# Patient Record
Sex: MALE | Race: BLACK OR AFRICAN AMERICAN | Employment: UNEMPLOYED | URBAN - METROPOLITAN AREA
[De-identification: names, ages, dates, MRNs, and addresses within clinical notes are randomized per-mention and may not be internally consistent; named-entity substitution may affect disease eponyms.]

---

## 2021-08-26 ENCOUNTER — APPOINTMENT (OUTPATIENT)
Dept: GENERAL RADIOLOGY | Age: 61
End: 2021-08-26
Attending: EMERGENCY MEDICINE
Payer: MEDICARE

## 2021-08-26 ENCOUNTER — HOSPITAL ENCOUNTER (EMERGENCY)
Age: 61
Discharge: HOME | End: 2021-08-27
Attending: EMERGENCY MEDICINE | Admitting: EMERGENCY MEDICINE
Payer: MEDICARE

## 2021-08-26 ENCOUNTER — HOSPITAL ENCOUNTER (EMERGENCY)
Age: 61
Discharge: LEFT BEFORE DISCHARGE | End: 2021-08-26
Payer: MEDICARE

## 2021-08-26 DIAGNOSIS — R07.9 CHEST PAIN, UNSPECIFIED TYPE: Primary | ICD-10-CM

## 2021-08-26 LAB
ALBUMIN SERPL-MCNC: 3.2 G/DL (ref 3.5–5)
ALBUMIN/GLOB SERPL: 0.7 {RATIO} (ref 1.1–2.2)
ALP SERPL-CCNC: 118 U/L (ref 45–117)
ALT SERPL-CCNC: 28 U/L (ref 12–78)
ANION GAP SERPL CALC-SCNC: 6 MMOL/L (ref 5–15)
AST SERPL W P-5'-P-CCNC: 20 U/L (ref 15–37)
BASOPHILS # BLD: 0 K/UL (ref 0–0.1)
BASOPHILS NFR BLD: 0 % (ref 0–1)
BILIRUB SERPL-MCNC: 0.3 MG/DL (ref 0.2–1)
BUN SERPL-MCNC: 12 MG/DL (ref 6–20)
BUN/CREAT SERPL: 10 (ref 12–20)
CA-I BLD-MCNC: 9.1 MG/DL (ref 8.5–10.1)
CHLORIDE SERPL-SCNC: 109 MMOL/L (ref 97–108)
CO2 SERPL-SCNC: 26 MMOL/L (ref 21–32)
CREAT SERPL-MCNC: 1.24 MG/DL (ref 0.7–1.3)
DIFFERENTIAL METHOD BLD: ABNORMAL
EOSINOPHIL # BLD: 0 K/UL (ref 0–0.4)
EOSINOPHIL NFR BLD: 0 % (ref 0–7)
ERYTHROCYTE [DISTWIDTH] IN BLOOD BY AUTOMATED COUNT: 14.1 % (ref 11.5–14.5)
GLOBULIN SER CALC-MCNC: 4.7 G/DL (ref 2–4)
GLUCOSE SERPL-MCNC: 121 MG/DL (ref 65–100)
HCT VFR BLD AUTO: 36.5 % (ref 36.6–50.3)
HGB BLD-MCNC: 12.2 G/DL (ref 12.1–17)
IMM GRANULOCYTES # BLD AUTO: 0 K/UL (ref 0–0.04)
IMM GRANULOCYTES NFR BLD AUTO: 0 % (ref 0–0.5)
LIPASE SERPL-CCNC: 69 U/L (ref 73–393)
LYMPHOCYTES # BLD: 0.7 K/UL (ref 0.8–3.5)
LYMPHOCYTES NFR BLD: 6 % (ref 12–49)
MCH RBC QN AUTO: 29.8 PG (ref 26–34)
MCHC RBC AUTO-ENTMCNC: 33.4 G/DL (ref 30–36.5)
MCV RBC AUTO: 89 FL (ref 80–99)
MONOCYTES # BLD: 0.2 K/UL (ref 0–1)
MONOCYTES NFR BLD: 2 % (ref 5–13)
NEUTS SEG # BLD: 11.4 K/UL (ref 1.8–8)
NEUTS SEG NFR BLD: 92 % (ref 32–75)
NRBC # BLD: 0 K/UL (ref 0–0.01)
NRBC BLD-RTO: 0 PER 100 WBC
PLATELET # BLD AUTO: 369 K/UL (ref 150–400)
PMV BLD AUTO: 9.3 FL (ref 8.9–12.9)
POTASSIUM SERPL-SCNC: 4 MMOL/L (ref 3.5–5.1)
PROT SERPL-MCNC: 7.9 G/DL (ref 6.4–8.2)
RBC # BLD AUTO: 4.1 M/UL (ref 4.1–5.7)
SODIUM SERPL-SCNC: 141 MMOL/L (ref 136–145)
TROPONIN I SERPL-MCNC: <0.05 NG/ML
WBC # BLD AUTO: 12.4 K/UL (ref 4.1–11.1)

## 2021-08-26 PROCEDURE — 36415 COLL VENOUS BLD VENIPUNCTURE: CPT

## 2021-08-26 PROCEDURE — 71045 X-RAY EXAM CHEST 1 VIEW: CPT

## 2021-08-26 PROCEDURE — 93005 ELECTROCARDIOGRAM TRACING: CPT

## 2021-08-26 PROCEDURE — 74011250637 HC RX REV CODE- 250/637: Performed by: EMERGENCY MEDICINE

## 2021-08-26 PROCEDURE — 96375 TX/PRO/DX INJ NEW DRUG ADDON: CPT

## 2021-08-26 PROCEDURE — 75810000275 HC EMERGENCY DEPT VISIT NO LEVEL OF CARE

## 2021-08-26 PROCEDURE — 99284 EMERGENCY DEPT VISIT MOD MDM: CPT

## 2021-08-26 PROCEDURE — 74011000250 HC RX REV CODE- 250: Performed by: EMERGENCY MEDICINE

## 2021-08-26 PROCEDURE — 83690 ASSAY OF LIPASE: CPT

## 2021-08-26 PROCEDURE — 74011250636 HC RX REV CODE- 250/636: Performed by: EMERGENCY MEDICINE

## 2021-08-26 PROCEDURE — 84484 ASSAY OF TROPONIN QUANT: CPT

## 2021-08-26 PROCEDURE — 85025 COMPLETE CBC W/AUTO DIFF WBC: CPT

## 2021-08-26 PROCEDURE — 80053 COMPREHEN METABOLIC PANEL: CPT

## 2021-08-26 PROCEDURE — 96374 THER/PROPH/DIAG INJ IV PUSH: CPT

## 2021-08-26 RX ORDER — LIDOCAINE HYDROCHLORIDE 20 MG/ML
15 SOLUTION OROPHARYNGEAL
Status: COMPLETED | OUTPATIENT
Start: 2021-08-26 | End: 2021-08-26

## 2021-08-26 RX ORDER — ONDANSETRON 2 MG/ML
4 INJECTION INTRAMUSCULAR; INTRAVENOUS
Status: DISCONTINUED | OUTPATIENT
Start: 2021-08-26 | End: 2021-08-26

## 2021-08-26 RX ORDER — ONDANSETRON 2 MG/ML
4 INJECTION INTRAMUSCULAR; INTRAVENOUS
Status: COMPLETED | OUTPATIENT
Start: 2021-08-26 | End: 2021-08-26

## 2021-08-26 RX ORDER — MAG HYDROX/ALUMINUM HYD/SIMETH 200-200-20
30 SUSPENSION, ORAL (FINAL DOSE FORM) ORAL ONCE
Status: COMPLETED | OUTPATIENT
Start: 2021-08-26 | End: 2021-08-26

## 2021-08-26 RX ADMIN — ALUMINUM HYDROXIDE, MAGNESIUM HYDROXIDE, AND SIMETHICONE 30 ML: 200; 200; 20 SUSPENSION ORAL at 23:54

## 2021-08-26 RX ADMIN — LIDOCAINE HYDROCHLORIDE 15 ML: 20 SOLUTION ORAL; TOPICAL at 23:54

## 2021-08-26 RX ADMIN — FAMOTIDINE 20 MG: 10 INJECTION, SOLUTION INTRAVENOUS at 23:53

## 2021-08-26 RX ADMIN — ONDANSETRON 4 MG: 2 INJECTION INTRAMUSCULAR; INTRAVENOUS at 23:51

## 2021-08-27 VITALS
TEMPERATURE: 99 F | WEIGHT: 150 LBS | HEIGHT: 73 IN | BODY MASS INDEX: 19.88 KG/M2 | RESPIRATION RATE: 18 BRPM | DIASTOLIC BLOOD PRESSURE: 73 MMHG | SYSTOLIC BLOOD PRESSURE: 137 MMHG | OXYGEN SATURATION: 98 % | HEART RATE: 58 BPM

## 2021-08-27 LAB — TROPONIN I SERPL-MCNC: <0.05 NG/ML

## 2021-08-27 PROCEDURE — 84484 ASSAY OF TROPONIN QUANT: CPT

## 2021-08-27 PROCEDURE — 36415 COLL VENOUS BLD VENIPUNCTURE: CPT

## 2021-08-27 RX ORDER — GUAIFENESIN 100 MG/5ML
81 LIQUID (ML) ORAL DAILY
Qty: 30 TABLET | Refills: 0 | Status: SHIPPED | OUTPATIENT
Start: 2021-08-27

## 2021-08-27 RX ORDER — PANTOPRAZOLE SODIUM 40 MG/1
40 TABLET, DELAYED RELEASE ORAL
Qty: 60 TABLET | Refills: 0 | Status: SHIPPED | OUTPATIENT
Start: 2021-08-27

## 2021-08-27 RX ORDER — ONDANSETRON 4 MG/1
4 TABLET, ORALLY DISINTEGRATING ORAL
Qty: 20 TABLET | Refills: 0 | Status: SHIPPED | OUTPATIENT
Start: 2021-08-27

## 2021-08-27 NOTE — ED PROVIDER NOTES
HPI   Chief Complaint   Patient presents with    Chest Pain     20-year-old male with history of CAD and stroke presents with chest pain. Patient reports 3 days of worsening severe burning epigastric pain radiating up to his sternum, associated with nausea, emesis (yellow sticky appearance, small volume), poor appetite. Eating solid food makes his pain worse. He has been drinking a lot of Gatorade. He has been taking Excedrin headache as well as heartburn medicine from the [employer] with no relief. He denies any shortness of breath. Bowel movements are normal. Has mild cough but no fever (temp 99.6). As soon as he got in ambulance his pain improved to 5/10. Given 324mg asa by EMS. Patient reports about 8 years ago he was told by a doctor that he had a heart attack, but he has never had a cardiac cath, never had stenting or any other cardiac procedures. Patient reports he is supposed to take a blood pressure medicine, however he has been self-medicating with apple cider vinegar and has not been compliant with his blood pressure medicine. Patient denies taking baby aspirin for cardiac and stroke prevention. Past Medical History:   Diagnosis Date    CAD (coronary artery disease)     Stroke [facility])        History reviewed. No pertinent surgical history. History reviewed. No pertinent family history.     Social History     Socioeconomic History    Marital status:      Spouse name: Not on file    Number of children: Not on file    Years of education: Not on file    Highest education level: Not on file   Occupational History    Not on file   Tobacco Use    Smoking status: Current Every Day Smoker   Substance and Sexual Activity    Alcohol use: Yes     Comment: occasional    Drug use: Yes     Types: Marijuana    Sexual activity: Not on file   Other Topics Concern    Not on file   Social History Narrative    Not on file     Social Determinants of Health     Financial Resource Strain:  Difficulty of Paying Living Expenses:    Food Insecurity:     Worried About Running Out of Food in the Last Year:     Ran Out of Food in the Last Year:    Transportation Needs:     Lack of Transportation (Medical):  Lack of Transportation (Non-Medical):    Physical Activity:     Days of Exercise per Week:     Minutes of Exercise per Session:    Stress:     Feeling of Stress :    Social Connections:     Frequency of Communication with Friends and Family:     Frequency of Social Gatherings with Friends and Family:     Attends Bahai Services:     Active Member of Clubs or Organizations:     Attends Club or Organization Meetings:     Marital Status:    Intimate Partner Violence:     Fear of Current or Ex-Partner:     Emotionally Abused:     Physically Abused:     Sexually Abused: ALLERGIES: Patient has no known allergies. Review of Systems   Respiratory: Positive for cough. Cardiovascular: Positive for chest pain. Gastrointestinal: Positive for abdominal pain, nausea and vomiting. All other systems reviewed and are negative. Vitals:    08/26/21 2239 08/26/21 2355 08/27/21 0107   BP: (!) 133/112 (!) 154/83 137/73   Pulse: 72 86 (!) 58   Resp: 18 18 18   Temp: 99 °F (37.2 °C)     SpO2: 99% 98% 98%   Weight: 68 kg (150 lb)     Height: 6' 1\" (1.854 m)              Physical Exam   Patient Vitals for the past 8 hrs:   Temp Pulse Resp BP SpO2   08/27/21 0107 -- (!) 58 18 137/73 98 %   08/26/21 2355 -- 86 18 (!) 154/83 98 %   08/26/21 2239 99 °F (37.2 °C) 72 18 (!) 133/112 99 %        Nursing note and vitals reviewed. Constitutional: NAD. Head: Normocephalic and atraumatic. Mouth/Throat: Airway patent. Moist mucous membranes. Eyes: EOMI. No scleral icterus. Neck: Neck supple. Cardiovascular: Normal rate, regular rhythm. Normal heart sounds. No murmur, rub, or gallop. Good pulses throughout. Pulmonary/Chest: No respiratory distress. Clear to auscultation bilaterally.   No wheezes, rales, or rhonchi. Abdominal/GI: BS normal, Soft, tender epigastrium, non-distended. No rebound or guarding. Musculoskeletal: No gross injuries or deformities. No leg swelling. Neurological: Alert and oriented to person, place, and time. Cranial Nerves 2-12 intact. Moving all extremities. No gross deficits. Psych: Pleasant, cooperative. Skin: Skin is warm and dry. No rash noted. MDM   Ddx = reflux, gastritis, gastric or peptic ulcer, ACS, pancreatitis, musculoskeletal pain. EKG was obtained for chest pain. My preliminary interpretation at 2242 showing sinus bradycardia, rate 55, LVH, no STEMI. Troponin negative x2. Patient feeling symptom improved after GI cocktail and Pepcid. Rx pantoprazole, baby aspirin, Zofran ODT. Referred to Dr. Pat Herrera for cardiology evaluation considering history of CAD but not being treated and never had cardiac cath. Patient says he already has PCP appointment on September 1. I urged him to keep it. Discharged with return precautions (worsening chest pain, pressure-like chest pain, shortness of breath, dizziness). Patient and his wife verbalized understanding of the return precautions. Labs Reviewed   CBC WITH AUTOMATED DIFF - Abnormal; Notable for the following components:       Result Value    WBC 12.4 (*)     HCT 36.5 (*)     NEUTROPHILS 92 (*)     LYMPHOCYTES 6 (*)     MONOCYTES 2 (*)     ABS. NEUTROPHILS 11.4 (*)     ABS. LYMPHOCYTES 0.7 (*)     All other components within normal limits   METABOLIC PANEL, COMPREHENSIVE - Abnormal; Notable for the following components:    Chloride 109 (*)     Glucose 121 (*)     BUN/Creatinine ratio 10 (*)     GFR est non-AA 59 (*)     Alk.  phosphatase 118 (*)     Albumin 3.2 (*)     Globulin 4.7 (*)     A-G Ratio 0.7 (*)     All other components within normal limits   LIPASE - Abnormal; Notable for the following components:    Lipase 69 (*)     All other components within normal limits   TROPONIN I   TROPONIN I     XR CHEST PORT   Final Result   The cardiomediastinal silhouette is appropriate for age, technique,   and lung expansion. Pulmonary vasculature is not congested. The lungs are   essentially clear. No effusion or pneumothorax is seen. Medications   alum-mag hydroxide-simeth (MYLANTA) oral suspension 30 mL (30 mL Oral Given 8/26/21 2354)   lidocaine (XYLOCAINE) 2 % viscous solution 15 mL (15 mL Mouth/Throat Given 8/26/21 2354)   ondansetron (ZOFRAN) injection 4 mg (4 mg IntraVENous Given 8/26/21 2351)   famotidine (PF) (PEPCID) 20 mg in 0.9% sodium chloride 10 mL injection (20 mg IntraVENous Given 8/26/21 2353)       Diagnosis:    ICD-10-CM ICD-9-CM    1. Chest pain, unspecified type  R07.9 786.50        I, Diana Farfan MD, am  the first and primary ED provider for this patient.           Procedures

## 2021-08-27 NOTE — ED TRIAGE NOTES
Patient arrived via EMS. Patient has been having chest pain for the past three days with intermittent vomiting. Patient has received 324 ASA in route. Chest pain is about 5/10 pain in the mid sternum.

## 2021-08-28 ENCOUNTER — HOSPITAL ENCOUNTER (EMERGENCY)
Age: 61
Discharge: HOME | End: 2021-08-28
Attending: EMERGENCY MEDICINE
Payer: MEDICARE

## 2021-08-28 ENCOUNTER — APPOINTMENT (OUTPATIENT)
Dept: CT IMAGING | Age: 61
End: 2021-08-28
Attending: EMERGENCY MEDICINE
Payer: MEDICARE

## 2021-08-28 VITALS
SYSTOLIC BLOOD PRESSURE: 162 MMHG | HEART RATE: 66 BPM | HEIGHT: 70 IN | OXYGEN SATURATION: 100 % | TEMPERATURE: 98.2 F | BODY MASS INDEX: 21.46 KG/M2 | RESPIRATION RATE: 20 BRPM | WEIGHT: 149.91 LBS | DIASTOLIC BLOOD PRESSURE: 77 MMHG

## 2021-08-28 DIAGNOSIS — R10.13 ABDOMINAL PAIN, EPIGASTRIC: Primary | ICD-10-CM

## 2021-08-28 DIAGNOSIS — I10 HYPERTENSION, UNSPECIFIED TYPE: ICD-10-CM

## 2021-08-28 LAB
ALBUMIN SERPL-MCNC: 3.7 G/DL (ref 3.5–5)
ALBUMIN/GLOB SERPL: 0.8 {RATIO} (ref 1.1–2.2)
ALP SERPL-CCNC: 120 U/L (ref 45–117)
ALT SERPL-CCNC: 33 U/L (ref 12–78)
ANION GAP SERPL CALC-SCNC: 9 MMOL/L (ref 5–15)
APPEARANCE UR: CLEAR
AST SERPL W P-5'-P-CCNC: 30 U/L (ref 15–37)
ATRIAL RATE: 51 BPM
BACTERIA URNS QL MICRO: NEGATIVE /HPF
BILIRUB SERPL-MCNC: 0.4 MG/DL (ref 0.2–1)
BILIRUB UR QL: NEGATIVE
BUN SERPL-MCNC: 10 MG/DL (ref 6–20)
BUN/CREAT SERPL: 8 (ref 12–20)
CA-I BLD-MCNC: 9.7 MG/DL (ref 8.5–10.1)
CALCULATED P AXIS, ECG09: 63 DEGREES
CALCULATED R AXIS, ECG10: 67 DEGREES
CALCULATED T AXIS, ECG11: 70 DEGREES
CHLORIDE SERPL-SCNC: 108 MMOL/L (ref 97–108)
CO2 SERPL-SCNC: 22 MMOL/L (ref 21–32)
COLOR UR: YELLOW
CREAT SERPL-MCNC: 1.33 MG/DL (ref 0.7–1.3)
DIAGNOSIS, 93000: NORMAL
ERYTHROCYTE [DISTWIDTH] IN BLOOD BY AUTOMATED COUNT: 14.1 % (ref 11.5–14.5)
GLOBULIN SER CALC-MCNC: 4.7 G/DL (ref 2–4)
GLUCOSE SERPL-MCNC: 132 MG/DL (ref 65–100)
GLUCOSE UR STRIP.AUTO-MCNC: NEGATIVE MG/DL
HCT VFR BLD AUTO: 40 % (ref 36.6–50.3)
HGB BLD-MCNC: 13.3 G/DL (ref 12.1–17)
HGB UR QL STRIP: NEGATIVE
KETONES UR QL STRIP.AUTO: ABNORMAL MG/DL
LEUKOCYTE ESTERASE UR QL STRIP.AUTO: NEGATIVE
LIPASE SERPL-CCNC: 83 U/L (ref 73–393)
MCH RBC QN AUTO: 29.6 PG (ref 26–34)
MCHC RBC AUTO-ENTMCNC: 33.3 G/DL (ref 30–36.5)
MCV RBC AUTO: 89.1 FL (ref 80–99)
MUCOUS THREADS URNS QL MICRO: ABNORMAL /LPF
NITRITE UR QL STRIP.AUTO: NEGATIVE
NRBC # BLD: 0 K/UL (ref 0–0.01)
NRBC BLD-RTO: 0 PER 100 WBC
P-R INTERVAL, ECG05: 140 MS
PH UR STRIP: 8 [PH] (ref 5–8)
PLATELET # BLD AUTO: 379 K/UL (ref 150–400)
PMV BLD AUTO: 9.1 FL (ref 8.9–12.9)
POTASSIUM SERPL-SCNC: 3.5 MMOL/L (ref 3.5–5.1)
PROT SERPL-MCNC: 8.4 G/DL (ref 6.4–8.2)
PROT UR STRIP-MCNC: NEGATIVE MG/DL
Q-T INTERVAL, ECG07: 444 MS
QRS DURATION, ECG06: 90 MS
QTC CALCULATION (BEZET), ECG08: 409 MS
RBC # BLD AUTO: 4.49 M/UL (ref 4.1–5.7)
RBC #/AREA URNS HPF: ABNORMAL /HPF (ref 0–5)
SODIUM SERPL-SCNC: 139 MMOL/L (ref 136–145)
SP GR UR REFRACTOMETRY: 1.02 (ref 1–1.03)
TROPONIN I SERPL-MCNC: <0.05 NG/ML
UROBILINOGEN UR QL STRIP.AUTO: 0.1 EU/DL (ref 0.1–1)
VENTRICULAR RATE, ECG03: 51 BPM
WBC # BLD AUTO: 14.2 K/UL (ref 4.1–11.1)
WBC URNS QL MICRO: ABNORMAL /HPF (ref 0–4)

## 2021-08-28 PROCEDURE — 81001 URINALYSIS AUTO W/SCOPE: CPT

## 2021-08-28 PROCEDURE — 99284 EMERGENCY DEPT VISIT MOD MDM: CPT

## 2021-08-28 PROCEDURE — 36415 COLL VENOUS BLD VENIPUNCTURE: CPT

## 2021-08-28 PROCEDURE — 96374 THER/PROPH/DIAG INJ IV PUSH: CPT

## 2021-08-28 PROCEDURE — 96375 TX/PRO/DX INJ NEW DRUG ADDON: CPT

## 2021-08-28 PROCEDURE — 74011000636 HC RX REV CODE- 636: Performed by: EMERGENCY MEDICINE

## 2021-08-28 PROCEDURE — 83690 ASSAY OF LIPASE: CPT

## 2021-08-28 PROCEDURE — 84484 ASSAY OF TROPONIN QUANT: CPT

## 2021-08-28 PROCEDURE — 74174 CTA ABD&PLVS W/CONTRAST: CPT

## 2021-08-28 PROCEDURE — 93005 ELECTROCARDIOGRAM TRACING: CPT

## 2021-08-28 PROCEDURE — 80053 COMPREHEN METABOLIC PANEL: CPT

## 2021-08-28 PROCEDURE — 74011250636 HC RX REV CODE- 250/636: Performed by: EMERGENCY MEDICINE

## 2021-08-28 PROCEDURE — 85027 COMPLETE CBC AUTOMATED: CPT

## 2021-08-28 RX ORDER — MORPHINE SULFATE 4 MG/ML
4 INJECTION INTRAVENOUS ONCE
Status: COMPLETED | OUTPATIENT
Start: 2021-08-28 | End: 2021-08-28

## 2021-08-28 RX ORDER — ONDANSETRON 2 MG/ML
4 INJECTION INTRAMUSCULAR; INTRAVENOUS
Status: COMPLETED | OUTPATIENT
Start: 2021-08-28 | End: 2021-08-28

## 2021-08-28 RX ADMIN — IOPAMIDOL 100 ML: 755 INJECTION, SOLUTION INTRAVENOUS at 12:10

## 2021-08-28 RX ADMIN — ONDANSETRON 4 MG: 2 INJECTION INTRAMUSCULAR; INTRAVENOUS at 12:28

## 2021-08-28 RX ADMIN — MORPHINE SULFATE 4 MG: 4 INJECTION, SOLUTION INTRAMUSCULAR; INTRAVENOUS at 12:27

## 2021-08-28 NOTE — DISCHARGE INSTRUCTIONS
CT imaging demonstrated slightly dilated aorta at 3.1 cm. Please follow-up for annual screening to ensure no further enlargement.   Please follow-up with your primary care physician for additional evaluation of your lung nodules

## 2021-08-28 NOTE — ED TRIAGE NOTES
Took 40mg Asprin th this morning at 5 am and then abdominal pain with nausea, no vomiting. Took zofran, without relief. Alert and oriented, able to follow commands, iv placed by ems.

## 2021-08-28 NOTE — ED PROVIDER NOTES
EMERGENCY DEPARTMENT HISTORY AND PHYSICAL EXAM      Date: 8/28/2021  Patient Name: Bhanu Arellano      History of Presenting Illness     Chief Complaint   Patient presents with    Abdominal Pain       History Provided By: Patient    HPI: Bhanu Arellano, 61 y.o. male with a past medical history significant hypertension, myocardial infarction and stroke presents to the ED with cc of abdominal pain, nausea, vomiting starting acutely this morning. Patient was seen last week with similar symptoms at which time he underwent laboratory evaluation and treatment with GI cocktail. Lab results were within normal limits and patient did have symptomatic improvement after treatment with medications. He returns today with worsening symptoms. He has never had symptoms like this previously. He endorses anorexia and epigastric abdominal pain with associated nausea and one episode of nonbloody nonbilious emesis. He denies fever, chills, chest pain, shortness of breath, melena, hematochezia. There are no other complaints, changes, or physical findings at this time. PCP: None    Current Outpatient Medications   Medication Sig Dispense Refill    pantoprazole (PROTONIX) 40 mg tablet Take 1 Tablet by mouth Daily (before breakfast). 60 Tablet 0    ondansetron (Zofran ODT) 4 mg disintegrating tablet 1 Tablet by SubLINGual route every eight (8) hours as needed for Nausea or Vomiting. 20 Tablet 0    aspirin 81 mg chewable tablet Take 1 Tablet by mouth daily. 30 Tablet 0       Past History     Past Medical History:  Past Medical History:   Diagnosis Date    CAD (coronary artery disease)     Stroke [facility])        Past Surgical History:  No past surgical history on file. Family History:  History reviewed. No pertinent family history.     Social History:  Social History     Tobacco Use    Smoking status: Current Every Day Smoker   Substance Use Topics    Alcohol use: Yes     Comment: occasional    Drug use: Yes     Types: Marijuana       Allergies:  No Known Allergies      Review of Systems     Review of Systems   Constitutional: Positive for appetite change and diaphoresis. Negative for chills and fever. HENT: Negative for congestion and rhinorrhea. Eyes: Negative for photophobia and visual disturbance. Respiratory: Negative for cough, chest tightness and shortness of breath. Cardiovascular: Negative for chest pain, palpitations and leg swelling. Gastrointestinal: Positive for abdominal pain, nausea and vomiting. Negative for anal bleeding, blood in stool, constipation, diarrhea and rectal pain. Endocrine: Negative for cold intolerance and heat intolerance. Genitourinary: Negative for difficulty urinating and dysuria. Musculoskeletal: Negative for arthralgias and myalgias. Skin: Negative for color change and rash. Allergic/Immunologic: Negative for environmental allergies and food allergies. Neurological: Negative for weakness and headaches. Hematological: Negative for adenopathy. Does not bruise/bleed easily. Psychiatric/Behavioral: Negative for agitation and behavioral problems. Physical Exam     Physical Exam  Vitals reviewed. Constitutional:       Appearance: He is diaphoretic. HENT:      Head: Normocephalic and atraumatic. Mouth/Throat:      Mouth: Mucous membranes are moist.   Eyes:      Extraocular Movements: Extraocular movements intact. Cardiovascular:      Rate and Rhythm: Regular rhythm. Bradycardia present. Heart sounds: Normal heart sounds. Pulmonary:      Effort: Pulmonary effort is normal.      Breath sounds: Normal breath sounds. Abdominal:      General: Abdomen is flat. Bowel sounds are normal. There is no distension. Palpations: Abdomen is soft. There is no pulsatile mass. Tenderness: There is abdominal tenderness in the epigastric area. There is no guarding or rebound. Negative signs include Song's sign, Rovsing's sign and McBurney's sign. Hernia: No hernia is present. Skin:     General: Skin is warm. Capillary Refill: Capillary refill takes less than 2 seconds. Neurological:      General: No focal deficit present. Mental Status: He is alert and oriented to person, place, and time. Psychiatric:         Mood and Affect: Mood normal.         Behavior: Behavior normal.         Lab and Diagnostic Study Results     Labs -     Recent Results (from the past 12 hour(s))   EKG, 12 LEAD, INITIAL    Collection Time: 08/28/21 10:18 AM   Result Value Ref Range    Ventricular Rate 51 BPM    Atrial Rate 51 BPM    P-R Interval 140 ms    QRS Duration 90 ms    Q-T Interval 444 ms    QTC Calculation (Bezet) 409 ms    Calculated P Axis 63 degrees    Calculated R Axis 67 degrees    Calculated T Axis 70 degrees    Diagnosis       Sinus bradycardia  Voltage criteria for left ventricular hypertrophy  Abnormal ECG  When compared with ECG of 26-AUG-2021 22:39, (Unconfirmed)  No significant change was found  Confirmed by Nabila Johnson MD, SHAWN ([id number]) on 8/28/2021 11:48:35 AM     CBC W/O DIFF    Collection Time: 08/28/21 10:25 AM   Result Value Ref Range    WBC 14.2 (H) 4.1 - 11.1 K/uL    RBC 4.49 4. 10 - 5.70 M/uL    HGB 13.3 12.1 - 17.0 g/dL    HCT 40.0 36.6 - 50.3 %    MCV 89.1 80.0 - 99.0 FL    MCH 29.6 26.0 - 34.0 PG    MCHC 33.3 30.0 - 36.5 g/dL    RDW 14.1 11.5 - 14.5 %    PLATELET 884 585 - 037 K/uL    MPV 9.1 8.9 - 12.9 FL    NRBC 0.0 0.0  WBC    ABSOLUTE NRBC 0.00 0.00 - 0.49 K/uL   METABOLIC PANEL, COMPREHENSIVE    Collection Time: 08/28/21 10:25 AM   Result Value Ref Range    Sodium 139 136 - 145 mmol/L    Potassium 3.5 3.5 - 5.1 mmol/L    Chloride 108 97 - 108 mmol/L    CO2 22 21 - 32 mmol/L    Anion gap 9 5 - 15 mmol/L    Glucose 132 (H) 65 - 100 mg/dL    BUN 10 6 - 20 mg/dL    Creatinine 1.33 (H) 0.70 - 1.30 mg/dL    BUN/Creatinine ratio 8 (L) 12 - 20      GFR est AA >60 >60 ml/min/1.73m2    GFR est non-AA 55 (L) >60 ml/min/1.73m2    Calcium 9.7 8.5 - 10.1 mg/dL    Bilirubin, total 0.4 0.2 - 1.0 mg/dL    AST (SGOT) 30 15 - 37 U/L    ALT (SGPT) 33 12 - 78 U/L    Alk. phosphatase 120 (H) 45 - 117 U/L    Protein, total 8.4 (H) 6.4 - 8.2 g/dL    Albumin 3.7 3.5 - 5.0 g/dL    Globulin 4.7 (H) 2.0 - 4.0 g/dL    A-G Ratio 0.8 (L) 1.1 - 2.2     LIPASE    Collection Time: 08/28/21 10:25 AM   Result Value Ref Range    Lipase 83 73 - 393 U/L   TROPONIN I    Collection Time: 08/28/21 10:25 AM   Result Value Ref Range    Troponin-I, Qt. <0.05 <0.05 ng/mL   URINALYSIS W/MICROSCOPIC    Collection Time: 08/28/21 10:30 AM   Result Value Ref Range    Color Yellow      Appearance Clear Clear      Specific gravity 1.020 1.003 - 1.030      pH (UA) 8.0 5.0 - 8.0      Protein Negative Negative mg/dL    Glucose Negative Negative mg/dL    Ketone Small (A) Negative mg/dL    Bilirubin Negative Negative      Blood Negative Negative      Urobilinogen 0.1 0.1 - 1.0 EU/dL    Nitrites Negative Negative      Leukocyte Esterase Negative Negative      WBC 0-5 0 - 4 /hpf    RBC 0-5 0 - 5 /hpf    Bacteria Negative Negative /hpf    Mucus Trace /lpf       Radiologic Studies -   [unfilled]  CT Results  (Last 48 hours)               08/28/21 1208  CTA CHEST ABD PELV W CONT Final result    Narrative:  Chest, Abdomen and Pelvis CTA with and without contrast       Comparison: Portable chest August 26, 2021       Indication: Dissection protocol       Technique: Multiple axial CT images were performed through the chest, abdomen   and pelvis before and after the administration of IV but without oral contrast.   Coronal and sagittal reformats were performed. MIP images created. All CT imaging is performed using one or more of the following dose reduction   techniques:    -Automated exposure control,    -Adjustment of the mA and/or kV by patient size, and/or    -Use of iterative reconstruction technique.            Findings:       ===============   VASCULAR   ===============       Trace thoracic aortic calcification without aneurysm or dissection. Mid   descending abdominal aorta measures 3.1 cm AP. Mid descending thoracic aorta   measures 2.7 cm AP. No large central PE identified with the lobar and smaller vessels not well   opacified on this arterial phase study. Mild abdominal aortic calcification with minimal noncalcified plaque. No   abdominal aortic dissection. Proximal abdominal aorta at the celiac axis level   measures 2.4 cm AP. Mid abdominal aorta at the left renal artery origin measures   2 mm AP. Distal abdominal aorta measures 2.5 cm AP, slightly dilated. Right   common iliac artery is enlarged at 1.8 cm diameter with the left measuring 1.1   cm. No delayed images available to assess the venous structures.       ===============         CHEST   ===============       Pleura/Pericardium   No pleural or pericardial effusion. --   Heart   Within normal limits. --   Esophagus   No acute abnormality.   ----   Lymphadenopathy   Enlarged subcarinal lymph node measuring 1.5 cm short axis with minimal central   calcification. A few other smaller mediastinal lymph nodes. --   Upper Neck/Chest Wall   Visualized thyroid is within normal limits. No acute chest wall abnormality. --   Lungs   There is heterogeneous pleural-based masslike opacity in the medial right upper   lobe with small central air or cavitation. This measures approximately 3.3 cm AP   by 3.3 cm transverse and 3.8 cm craniocaudally. The medial left upper lobe has a focal nodular opacity measuring 3 x 1.9 x 1.3   cm also a small central air or potentially cavitation. Although this may   represent an infectious/inflammatory process, malignancy is not excluded and   follow-up after therapy is warranted. Small nearby nodularity near these   bilateral upper lobe findings. No significant dense airspace consolidation seen elsewhere. No pneumothorax identified.    5 x 9 mm irregular nodular opacity within the lingula on image 191, potentially   scarring or atelectasis given its thin height on coronal images. --   Bones   No focal osseous destructive lesion identified.       ===============   ABDOMEN/PELVIS   ===============   Liver:   Few tiny subcentimeter low-density lesions within the liver are too small to   characterize. Portal vein is not well opacified on these arterial phase images. No delayed images available. --   Gallbladder/Biliary:   Gallbladder is within normal limits. No biliary dilatation. --   Adrenal:   Right adrenal gland is within normal limits. Minimal nodularity of the left   adrenal gland. --   Spleen:   Small spleen. --   Pancreas:   Within normal limits. --   Kidneys/Ureters/Bladder:   No hydronephrosis or discrete nephrolithiasis. 1.9 cm low-density 6 HU cyst in   the right kidney's posterior upper pole. Right kidney's lower pole has a 2.9 cm   10 HU cyst. Small cystic the left kidney's upper pole. No ureteral dilatation or definite calculus. Empty bladder. --   Gastrointestinal:   No oral Contrast.   Nondistended stomach with minimal fluid. No small bowel dilatation to suggest obstruction. Appendix is within normal limits. Minimal stool right and transverse colon. Colon is mostly decompressed   elsewhere. No significant pericolonic fatty inflammatory change. --   Retroperitoneum/Peritoneum:   No free fluid or free air. No lymphadenopathy by CT size criteria. --   Abdomen/Pelvis Wall:   No acute abnormality identified. --   Osseous:   No focal osseous destructive lesion identified. Mild lumbar degenerative disc   disease.       ===============      IMPRESSION   ===============       1. Atherosclerotic vascular disease. No aortic aneurysm or dissection. 2.5 cm   dilatation of the distal abdominal aorta. Enlarged right common iliac artery. 2. Abnormal nodular opacity in the bilateral medial upper lobes with small air   component or cavitation.  This may represent infection/inflammation with   malignancy not excluded. Recommend short-term follow-up after therapy. Mildly   enlarged subcarinal lymph node with small central calcification, nonspecific. 3. No urinary tract obstruction. Bilateral renal cysts. See full report. CXR Results  (Last 48 hours)               08/26/21 2313  XR CHEST PORT Final result    Impression:  The cardiomediastinal silhouette is appropriate for age, technique,   and lung expansion. Pulmonary vasculature is not congested. The lungs are   essentially clear. No effusion or pneumothorax is seen. Narrative:                    Medical Decision Making and ED Course   - I am the first and primary provider for this patient AND AM THE PRIMARY PROVIDER OF RECORD. - I reviewed the vital signs, available nursing notes, past medical history, past surgical history, family history and social history. - Initial assessment performed. The patients presenting problems have been discussed, and the staff are in agreement with the care plan formulated and outlined with them. I have encouraged them to ask questions as they arise throughout their visit. Vital Signs-Reviewed the patient's vital signs. Patient Vitals for the past 12 hrs:   Temp Pulse Resp BP SpO2   08/28/21 1228 -- 66 20 (!) 162/77 100 %   08/28/21 1200 -- 86 20 (!) 200/95 100 %   08/28/21 1108 -- -- -- -- 100 %   08/28/21 1019 98.2 °F (36.8 °C) (!) 51 16 (!) 218/90 99 %       EKG interpretation: (Preliminary): Performed at 1018, and read at 1021  Sinus bradycardia with ventricular rate of 51, , QRS 90, QTc 409. LVH by voltage criteria. No evidence of ST depression or elevation or T wave inversions    Records Reviewed:  Old Medical Records    The patient presents with abdominal pain with a differential diagnosis of vascular disaster, cholecystitis, gastritis, gastroenteritis, GERD and pancreatitis    ED Course:              Provider Notes (Medical Decision Making): Patient is a 10year-old male who presents to the emergency department the chief complaint of epigastric abdominal pain with sudden onset this morning. He has associated anorexia, nausea, vomiting. Patient noted to have a systolic blood pressure of 212 on arrival.  He does not take any antihypertensive medications. EKG without evidence of STEMI. Given acute onset of symptoms, bounce back nature of the visit, history of hypertension and smoking history, CTA chest abdomen pelvis was ordered to ensure no vascular abnormality. Labs demonstrated a leukocytosis of 14.2-suspect reactive in nature given recent vomiting. CTA with no evidence of acute vascular pathology. MDM           Consultations:         Procedures and Critical Care       Performed by: Chuy Whitfield DO  Procedures         TOBACCO COUNSELING: Upon evaluation, pt expressed that they are a current tobacco user. For approximately 10 minutes, pt has been counseled on the dangers of smoking and was encouraged to quit as soon as possible in order to decrease further risks to their health. Pt has conveyed their understanding of the risks involved should they continue to use tobacco products. and HYPERTENSION COUNSELING: Education was provided to the patient today regarding their hypertension. Patient is made aware of their elevated blood pressure and is instructed to follow up this week with their Primary Care for a recheck. Patient is counseled regarding consequences of chronic, uncontrolled hypertension including kidney disease, heart disease, stroke or even death. Patient states their understanding and agrees to follow up this week. Additionally, during their visit, I discussed sodium restriction, maintaining ideal body weight and regular exercise program as physiologic means to achieve blood pressure control. The patient will strive towards this. Disposition     Disposition: Condition improved  DC- Adult Discharges:  All of the diagnostic tests were reviewed and questions answered. Diagnosis, care plan and treatment options were discussed. The patient understands the instructions and will follow up as directed. The patients results have been reviewed with them. They have been counseled regarding their diagnosis. The patient and spouse/SO verbally convey understanding and agreement of the signs, symptoms, diagnosis, treatment and prognosis and additionally agrees to follow up as recommended with their PCP in 24 - 48 hours. They also agree with the care-plan and convey that all of their questions have been answered. I have also put together some discharge instructions for them that include: 1) educational information regarding their diagnosis, 2) how to care for their diagnosis at home, as well a 3) list of reasons why they would want to return to the ED prior to their follow-up appointment, should their condition change. DC-The patient and spouse was given verbal abdominal pain warning signs and instructions        Remove if not discharged  DISCHARGE PLAN:  1. Current Discharge Medication List      CONTINUE these medications which have NOT CHANGED    Details   pantoprazole (PROTONIX) 40 mg tablet Take 1 Tablet by mouth Daily (before breakfast). Qty: 60 Tablet, Refills: 0      ondansetron (Zofran ODT) 4 mg disintegrating tablet 1 Tablet by SubLINGual route every eight (8) hours as needed for Nausea or Vomiting. Qty: 20 Tablet, Refills: 0      aspirin 81 mg chewable tablet Take 1 Tablet by mouth daily. Qty: 30 Tablet, Refills: 0           2. Follow-up Information     Follow up With Specialties Details Why [address] [facility] EMERGENCY DEPT Emergency Medicine  As needed, If symptoms worsen [address] [facility] [address] [phone]        3. Return to ED if worse   4. Current Discharge Medication List          Diagnosis     Clinical Impression:   1. Abdominal pain, epigastric    2.  Hypertension, unspecified type Attestations:    Lisa Gandhi, DO    Please note that this dictation was completed with [id number], the computer voice recognition software. Quite often unanticipated grammatical, syntax, homophones, and other interpretive errors are inadvertently transcribed by the computer software. Please disregard these errors. Please excuse any errors that have escaped final proofreading. Thank you.

## 2021-08-30 LAB
ATRIAL RATE: 55 BPM
CALCULATED P AXIS, ECG09: 73 DEGREES
CALCULATED R AXIS, ECG10: 57 DEGREES
CALCULATED T AXIS, ECG11: 73 DEGREES
DIAGNOSIS, 93000: NORMAL
P-R INTERVAL, ECG05: 146 MS
Q-T INTERVAL, ECG07: 418 MS
QRS DURATION, ECG06: 88 MS
QTC CALCULATION (BEZET), ECG08: 399 MS
VENTRICULAR RATE, ECG03: 55 BPM

## 2021-10-07 ENCOUNTER — TRANSCRIBE ORDER (OUTPATIENT)
Dept: SCHEDULING | Age: 61
End: 2021-10-07

## 2021-10-07 DIAGNOSIS — R91.8 OTHER NONSPECIFIC ABNORMAL FINDING OF LUNG FIELD: Primary | ICD-10-CM

## 2021-11-01 ENCOUNTER — HOSPITAL ENCOUNTER (OUTPATIENT)
Dept: CT IMAGING | Age: 61
Discharge: HOME | End: 2021-11-01
Attending: FAMILY MEDICINE
Payer: MEDICARE

## 2021-11-01 DIAGNOSIS — R91.8 OTHER NONSPECIFIC ABNORMAL FINDING OF LUNG FIELD: ICD-10-CM

## 2021-11-01 PROCEDURE — 71250 CT THORAX DX C-: CPT

## 2022-11-11 ENCOUNTER — TRANSCRIBE ORDER (OUTPATIENT)
Dept: SCHEDULING | Age: 62
End: 2022-11-11

## 2022-11-11 DIAGNOSIS — R91.8 OTHER NONSPECIFIC ABNORMAL FINDING OF LUNG FIELD: Primary | ICD-10-CM

## 2023-03-05 ENCOUNTER — APPOINTMENT (OUTPATIENT)
Dept: CT IMAGING | Age: 63
End: 2023-03-05
Attending: FAMILY MEDICINE
Payer: MEDICARE

## 2023-03-05 ENCOUNTER — APPOINTMENT (OUTPATIENT)
Dept: GENERAL RADIOLOGY | Age: 63
End: 2023-03-05
Attending: FAMILY MEDICINE
Payer: MEDICARE

## 2023-03-05 ENCOUNTER — HOSPITAL ENCOUNTER (EMERGENCY)
Age: 63
Discharge: HOME | End: 2023-03-05
Attending: FAMILY MEDICINE
Payer: MEDICARE

## 2023-03-05 VITALS
SYSTOLIC BLOOD PRESSURE: 111 MMHG | WEIGHT: 130 LBS | BODY MASS INDEX: 19.7 KG/M2 | DIASTOLIC BLOOD PRESSURE: 76 MMHG | TEMPERATURE: 98.8 F | RESPIRATION RATE: 18 BRPM | OXYGEN SATURATION: 98 % | HEART RATE: 76 BPM | HEIGHT: 68 IN

## 2023-03-05 DIAGNOSIS — M51.26 LUMBAR HERNIATED DISC: ICD-10-CM

## 2023-03-05 DIAGNOSIS — M51.36 DDD (DEGENERATIVE DISC DISEASE), LUMBAR: Primary | ICD-10-CM

## 2023-03-05 DIAGNOSIS — J98.4 CAVITARY LESION OF LUNG: ICD-10-CM

## 2023-03-05 PROCEDURE — 72131 CT LUMBAR SPINE W/O DYE: CPT

## 2023-03-05 PROCEDURE — 74011000250 HC RX REV CODE- 250: Performed by: FAMILY MEDICINE

## 2023-03-05 PROCEDURE — 71045 X-RAY EXAM CHEST 1 VIEW: CPT

## 2023-03-05 PROCEDURE — 99284 EMERGENCY DEPT VISIT MOD MDM: CPT

## 2023-03-05 RX ORDER — LIDOCAINE 4 G/100G
PATCH TOPICAL
Qty: 5 PATCH | Refills: 0 | Status: SHIPPED | OUTPATIENT
Start: 2023-03-05

## 2023-03-05 RX ORDER — HYDROCODONE BITARTRATE AND ACETAMINOPHEN 5; 325 MG/1; MG/1
1 TABLET ORAL
Qty: 8 TABLET | Refills: 0 | Status: SHIPPED | OUTPATIENT
Start: 2023-03-05 | End: 2023-03-08

## 2023-03-05 RX ORDER — LIDOCAINE 4 G/100G
1 PATCH TOPICAL ONCE
Status: DISCONTINUED | OUTPATIENT
Start: 2023-03-05 | End: 2023-03-05 | Stop reason: HOSPADM

## 2023-03-05 RX ORDER — DOXYCYCLINE HYCLATE 100 MG
100 TABLET ORAL 2 TIMES DAILY
Qty: 20 TABLET | Refills: 0 | Status: SHIPPED | OUTPATIENT
Start: 2023-03-05 | End: 2023-03-15

## 2023-03-05 RX ORDER — CHLORTHALIDONE 25 MG/1
TABLET ORAL
COMMUNITY
Start: 2022-12-23

## 2023-03-05 RX ORDER — ROSUVASTATIN CALCIUM 20 MG/1
TABLET, COATED ORAL
COMMUNITY
Start: 2023-02-14

## 2023-03-05 NOTE — DISCHARGE INSTRUCTIONS
Thank you! Thank you for allowing me to care for you in the emergency department. It is my goal to provide you with excellent care. If you have not received excellent quality care, please ask to speak to the nurse manager. Please fill out the survey that will come to you by mail or email since we listen to your feedback! Below you will find a list of your tests from today's visit. Should you have any questions, please do not hesitate to call the emergency department. Labs  No results found for this or any previous visit (from the past 12 hour(s)). Radiologic Studies  CT SPINE LUMB WO CONT   Final Result   No acute bony or soft tissue abnormality of the lumbar spine. Multilevel degenerative change. XR CHEST PORT   Final Result   Bilateral lung infiltrates greatest in the mid and upper lung fields. . Possible   cavitary lesion in the right lung apex. Infiltrates are new since the prior   study. .           CT Results  (Last 48 hours)                 03/05/23 1613  CT SPINE LUMB WO CONT Final result    Impression:  No acute bony or soft tissue abnormality of the lumbar spine. Multilevel degenerative change. Narrative:  ADDITIONAL INDICATION:  Lower back pain, radiating to both flanks       COMPARISON: None. TECHNIQUE: Multislice helical CT of the lumbar spine was performed. Sagittal and   coronal reformations were generated. CT dose reduction was achieved through use   of a standardized protocol tailored for this examination and automatic exposure   control for dose modulation. FINDINGS:   The alignment of the lumbar spine is normal. The vertebral body heights are   well-preserved. And disc space height is diminished at L5-S1 with endplate   sclerosis and spondylosis at all levels from L3 through S1. No paraspinal soft tissue mass. Incidental right renal cysts and diverticula. Normal appendix.        The findings at each level are as follows:        Multilevel degenerative change with central and foraminal stenosis most severe   at L3-4, L4-5 with bulging disc material at both levels. Degenerative disc   disease greatest at L5-S1. CXR Results  (Last 48 hours)                 03/05/23 1554  XR CHEST PORT Final result    Impression:  Bilateral lung infiltrates greatest in the mid and upper lung fields. . Possible   cavitary lesion in the right lung apex. Infiltrates are new since the prior   study. .           Narrative:          INDICATION:  Cough, prior possible cavitary lesions on CT, upcoming appointment   with pulmonology        EXAM: Chest single view. COMPARISON: 8/26/2021. FINDINGS: A single frontal view of the chest at 1551 hours shows diffuse lung   infiltrates greatest in the mid and upper lung fields bilaterally, new since the   prior study. . A cavitary lesion is suggested in the right lung apex medially. The heart, mediastinum and pulmonary vasculature are stable . The bony thorax   is unremarkable for age. .                 ------------------------------------------------------------------------------------------------------------  The exam and treatment you received in the Emergency Department were for an urgent problem and are not intended as complete care. It is important that you follow-up with a doctor, nurse practitioner, or physician assistant to:  (1) confirm your diagnosis,  (2) re-evaluation of changes in your illness and treatment, and  (3) for ongoing care. Please take your discharge instructions with you when you go to your follow-up appointment. If you have any problem arranging a follow-up appointment, contact the Emergency Department. If your symptoms become worse or you do not improve as expected and you are unable to reach your health care provider, please return to the Emergency Department. We are available 24 hours a day.      If a prescription has been provided, please have it filled as soon as possible to prevent a delay in treatment. If you have any questions or reservations about taking the medication due to side effects or interactions with other medications, please call your primary care provider or contact the ER.

## 2023-03-05 NOTE — Clinical Note
[address]  [address] [facility] [address]  [phone]    Work/School Note    Date: 3/5/2023    To Whom It May concern:    Anastacio Cross was seen and treated today in the emergency room by the following provider(s):  Attending Provider: Saundra Jean Baptiste Kalani is excused from work/school on 3/5/2023 through 3/7/2023. He is medically clear to return to work/school on 3/8/2023.          Sincerely,          Tristen Jean

## 2023-03-05 NOTE — Clinical Note
[address]  [address] [place] [id number]  [phone]    Work/School Note    Date: 3/5/2023    To Whom It May concern:    Gayathri Zoe was seen and treated today in the emergency room by the following provider(s):  Attending Provider: Felice Machado Nel Farrar is excused from work/school on 3/5/2023 through 3/7/2023. He is medically clear to return to work/school on 3/8/2023.          Sincerely,          Ra Cardenas, DO

## 2023-03-05 NOTE — ED PROVIDER NOTES
EMERGENCY DEPARTMENT HISTORY AND PHYSICAL EXAM      Date: 3/5/2023  Patient Name: Juanito Manning    History of Presenting Illness     Chief Complaint   Patient presents with    Back Pain       History Provided By:     HPI: Juanito Manning, is a very pleasant 58 y.o. male presenting to the ED with a chief complaint of back pain. Patient states he was lifting a piece of linoleum recently and \" tweaked my back. \"  Since this time he has had achy pain all across his lower back that occasionally radiates down the back of his legs. No weakness in extremities. No saddle anesthesia. No difficulty urinating or stooling. No fevers chills or rigors. Other than dry nagging cough. No other complaints. No chest pain or shortness of breath. The patient denies any other symptoms at this time. PCP: John Rob    No current facility-administered medications on file prior to encounter. Current Outpatient Medications on File Prior to Encounter   Medication Sig Dispense Refill    rosuvastatin (CRESTOR) 20 mg tablet       chlorthalidone (HYGROTON) 25 mg tablet       pantoprazole (PROTONIX) 40 mg tablet Take 1 Tablet by mouth Daily (before breakfast). 60 Tablet 0    ondansetron (Zofran ODT) 4 mg disintegrating tablet 1 Tablet by SubLINGual route every eight (8) hours as needed for Nausea or Vomiting. 20 Tablet 0    aspirin 81 mg chewable tablet Take 1 Tablet by mouth daily. 30 Tablet 0       Past History     Past Medical History:  Past Medical History:   Diagnosis Date    CAD (coronary artery disease)     Stroke [facility])        Past Surgical History:  History reviewed. No pertinent surgical history. Family History:  History reviewed. No pertinent family history.     Social History:  Social History     Tobacco Use    Smoking status: Every Day   Substance Use Topics    Alcohol use: Yes     Comment: occasional    Drug use: Yes     Types: Marijuana       Allergies:  No Known Allergies      Review of Systems     Review of Systems   Constitutional:  Negative for activity change, appetite change, chills, fatigue and fever. HENT:  Negative for congestion and sore throat. Eyes:  Negative for photophobia and visual disturbance. Respiratory:  Positive for cough. Negative for shortness of breath and wheezing. Cardiovascular:  Negative for chest pain, palpitations and leg swelling. Gastrointestinal:  Negative for abdominal pain, diarrhea, nausea and vomiting. Endocrine: Negative for cold intolerance and heat intolerance. Musculoskeletal:  Positive for back pain. Negative for gait problem and joint swelling. Skin:  Negative for color change and rash. Neurological:  Negative for dizziness and headaches. Physical Exam     Physical Exam  Constitutional:       General: He is not in acute distress. Appearance: Normal appearance. He is not toxic-appearing. HENT:      Head: Normocephalic and atraumatic. Right Ear: External ear normal.      Left Ear: External ear normal.      Mouth/Throat:      Mouth: Mucous membranes are moist.      Pharynx: Oropharynx is clear. Eyes:      Extraocular Movements: Extraocular movements intact. Conjunctiva/sclera: Conjunctivae normal.   Cardiovascular:      Rate and Rhythm: Normal rate and regular rhythm. Pulses: Normal pulses. Heart sounds: Normal heart sounds. Pulmonary:      Effort: Pulmonary effort is normal. No respiratory distress. Breath sounds: Normal breath sounds. No wheezing, rhonchi or rales. Abdominal:      General: There is no distension. Palpations: Abdomen is soft. Tenderness: There is no abdominal tenderness. There is no guarding or rebound. Musculoskeletal:         General: No deformity. Cervical back: Normal range of motion and neck supple. Right lower leg: No edema. Left lower leg: No edema.       Comments: Tenderness along lumbar paraspinal muscles as well as L3-L4-L5    Muscle strength testing in lower extremities at hip knee ankles 5/5    Sensory deficits lower extremities   Skin:     Capillary Refill: Capillary refill takes less than 2 seconds. Findings: No erythema or rash. Neurological:      General: No focal deficit present. Mental Status: He is alert and oriented to person, place, and time. Gait: Gait normal.   Psychiatric:         Mood and Affect: Mood normal.         Behavior: Behavior normal.       Lab and Diagnostic Study Results     Labs -   No results found for this or any previous visit (from the past 12 hour(s)). Radiologic Studies -   @lastxrresult@  CT Results  (Last 48 hours)                 03/05/23 1613  CT SPINE LUMB WO CONT Final result    Impression:  No acute bony or soft tissue abnormality of the lumbar spine. Multilevel degenerative change. Narrative:  ADDITIONAL INDICATION:  Lower back pain, radiating to both flanks       COMPARISON: None. TECHNIQUE: Multislice helical CT of the lumbar spine was performed. Sagittal and   coronal reformations were generated. CT dose reduction was achieved through use   of a standardized protocol tailored for this examination and automatic exposure   control for dose modulation. FINDINGS:   The alignment of the lumbar spine is normal. The vertebral body heights are   well-preserved. And disc space height is diminished at L5-S1 with endplate   sclerosis and spondylosis at all levels from L3 through S1. No paraspinal soft tissue mass. Incidental right renal cysts and diverticula. Normal appendix. The findings at each level are as follows:        Multilevel degenerative change with central and foraminal stenosis most severe   at L3-4, L4-5 with bulging disc material at both levels. Degenerative disc   disease greatest at L5-S1.                  CXR Results  (Last 48 hours)                 03/05/23 1554  XR CHEST PORT Final result    Impression:  Bilateral lung infiltrates greatest in the mid and upper lung fields. . Possible   cavitary lesion in the right lung apex. Infiltrates are new since the prior   study. .           Narrative:          INDICATION:  Cough, prior possible cavitary lesions on CT, upcoming appointment   with pulmonology        EXAM: Chest single view. COMPARISON: 8/26/2021. FINDINGS: A single frontal view of the chest at 1551 hours shows diffuse lung   infiltrates greatest in the mid and upper lung fields bilaterally, new since the   prior study. . A cavitary lesion is suggested in the right lung apex medially. The heart, mediastinum and pulmonary vasculature are stable . The bony thorax   is unremarkable for age. .                     Medical Decision Making   - I am the first provider for this patient. - I reviewed the vital signs, available nursing notes, past medical history, past surgical history, family history and social history. - Initial assessment performed. The patients presenting problems have been discussed, and they are in agreement with the care plan formulated and outlined with them. I have encouraged them to ask questions as they arise throughout their visit. Vital Signs-Reviewed the patient's vital signs. Patient Vitals for the past 12 hrs:   Temp Pulse Resp BP SpO2   03/05/23 1522 98.8 °F (37.1 °C) 76 18 111/76 98 %       CONSULTS: (Who and What was discussed)  None      Chronic Conditions: Reviewed above     Social Determinants affecting Dx or Tx: None     Records Reviewed (source and summary of external notes): Nursing notes           ED Course/ Provider Notes (Medical Decision Making):     Patient presented to the emergency department with the aforementioned chief complaint. On examination the patient is nontoxic. Vitals were reviewed per above. No signs of infectious process nor cauda equina. CT reviewed. Discussed significant degenerative changes and herniated disks with patient. No motor nor sensory deficits lower extremities.   Will have patient follow-up with orthopedic spine. Of note chart review shows prior CT scans show likely cavitary lesions in the lung apices. Patient states he was not aware of these findings. Chest x-ray ordered today and demonstrates Bilateral lung infiltrates greatest in the mid and upper lung fields. . Possible  cavitary lesion in the right lung apex. Infiltrates are new since the prior  study. .       Patient's oxygen saturation 98% on room air. No tachypnea. No fevers. He feels well other than chronic nagging cough. Case subsequently discussed with pulmonologist on-call, Dr. Veronica Karimi who recommends 100 mg doxycycline twice daily x10 days. He would like to see patient in clinic soon. Patient's information was exchanged and patient is expected to get a phone call from pulmonology clinic to make upcoming appointment. On reassessment, patient states he is feeling well. Would like to be discharged home. Patient understands importance of following up closely with pulmonology and orthopedic spine on outpatient basis. Sushant Cano was given a thorough list of signs and symptoms that would warrant an immediate return to the emergency department. Otherwise Sushant Cano will follow up with PCP, orthopedic spine, pulmonology. Procedures   Medical Decision Makingedical Decision Making  Performed by: Zeynep Patient, DO  Procedures  None       Disposition   Disposition:     Home     All of the diagnostic tests were reviewed and questions answered. Diagnosis, care plan and treatment options were discussed. The patient understands the instructions and will follow up as directed. The patients results have been reviewed with them. They have been counseled regarding their diagnosis. The patient verbally convey understanding and agreement of the signs, symptoms, diagnosis, treatment and prognosis and additionally agrees to follow up as recommended with their PCP in 24 - 48 hours.   They also agree with the care-plan and convey that all of their questions have been answered. I have also put together some discharge instructions for them that include: 1) educational information regarding their diagnosis, 2) how to care for their diagnosis at home, as well a 3) list of reasons why they would want to return to the ED prior to their follow-up appointment, should their condition change. DISCHARGE PLAN:    1. Current Discharge Medication List        START taking these medications    Details   doxycycline (VIBRA-TABS) 100 mg tablet Take 1 Tablet by mouth two (2) times a day for 10 days. Qty: 20 Tablet, Refills: 0      lidocaine 4 % patch Apply to area of maximal pain daily not to exceed 12 hours/day  Qty: 5 Patch, Refills: 0      HYDROcodone-acetaminophen (Norco) 5-325 mg per tablet Take 1 Tablet by mouth every eight (8) hours as needed for Pain for up to 3 days. Max Daily Amount: 3 Tablets. Qty: 8 Tablet, Refills: 0    Associated Diagnoses: DDD (degenerative disc disease), lumbar           CONTINUE these medications which have NOT CHANGED    Details   rosuvastatin (CRESTOR) 20 mg tablet       chlorthalidone (HYGROTON) 25 mg tablet       pantoprazole (PROTONIX) 40 mg tablet Take 1 Tablet by mouth Daily (before breakfast). Qty: 60 Tablet, Refills: 0      ondansetron (Zofran ODT) 4 mg disintegrating tablet 1 Tablet by SubLINGual route every eight (8) hours as needed for Nausea or Vomiting. Qty: 20 Tablet, Refills: 0      aspirin 81 mg chewable tablet Take 1 Tablet by mouth daily.   Qty: 30 Tablet, Refills: 0               2.   Follow-up Information       Follow up With Specialties Details Why Contact Info    Faiza Saldaña MD Pulmonary Disease, Internal Medicine Physician Schedule an appointment as soon as possible for a visit  This office will also be reaching out to you [address]  [phone]      [place], [address] [facility] Schedule an appointment as soon as possible for a visit   [id number] [facility]  [phone]                3.  Return to ED if worse       4. Current Discharge Medication List        START taking these medications    Details   doxycycline (VIBRA-TABS) 100 mg tablet Take 1 Tablet by mouth two (2) times a day for 10 days. Qty: 20 Tablet, Refills: 0  Start date: 3/5/2023, End date: 3/15/2023      lidocaine 4 % patch Apply to area of maximal pain daily not to exceed 12 hours/day  Qty: 5 Patch, Refills: 0  Start date: 3/5/2023      HYDROcodone-acetaminophen (Norco) 5-325 mg per tablet Take 1 Tablet by mouth every eight (8) hours as needed for Pain for up to 3 days. Max Daily Amount: 3 Tablets. Qty: 8 Tablet, Refills: 0  Start date: 3/5/2023, End date: 3/8/2023    Associated Diagnoses: DDD (degenerative disc disease), lumbar               Diagnosis     Clinical Impression:    1. DDD (degenerative disc disease), lumbar    2. Lumbar herniated disc    3. Cavitary lesion of lung        Attestations:    Cecilio Madrid, DO    Please note that this dictation was completed with [employer], the [employer] voice recognition software. Quite often unanticipated grammatical, syntax, homophones, and other interpretive errors are inadvertently transcribed by the computer software. Please disregard these errors. Please excuse any errors that have escaped final proofreading. Thank you.

## 2023-03-20 ENCOUNTER — TRANSCRIBE ORDER (OUTPATIENT)
Dept: SCHEDULING | Age: 63
End: 2023-03-20

## 2023-03-20 DIAGNOSIS — R91.8 LUNG INFILTRATE: Primary | ICD-10-CM

## 2023-04-22 DIAGNOSIS — R91.8 OTHER NONSPECIFIC ABNORMAL FINDING OF LUNG FIELD: Primary | ICD-10-CM

## 2023-05-29 ENCOUNTER — APPOINTMENT (OUTPATIENT)
Facility: HOSPITAL | Age: 63
DRG: 194 | End: 2023-05-29
Payer: MEDICARE

## 2023-05-29 ENCOUNTER — HOSPITAL ENCOUNTER (INPATIENT)
Facility: HOSPITAL | Age: 63
LOS: 2 days | Discharge: LEFT BEFORE DISCHARGE | DRG: 194 | End: 2023-05-31
Attending: FAMILY MEDICINE | Admitting: INTERNAL MEDICINE
Payer: MEDICARE

## 2023-05-29 DIAGNOSIS — J98.4 CAVITARY LESION OF LUNG: ICD-10-CM

## 2023-05-29 DIAGNOSIS — R04.2 HEMOPTYSIS: Primary | ICD-10-CM

## 2023-05-29 PROBLEM — N17.9 AKI (ACUTE KIDNEY INJURY) (HCC): Status: ACTIVE | Noted: 2023-05-29

## 2023-05-29 PROBLEM — R63.4 WEIGHT LOSS: Status: ACTIVE | Noted: 2023-05-29

## 2023-05-29 LAB
ABO + RH BLD: NORMAL
ALBUMIN SERPL-MCNC: 2.8 G/DL (ref 3.5–5)
ALBUMIN/GLOB SERPL: 0.5 (ref 1.1–2.2)
ALP SERPL-CCNC: 81 U/L (ref 45–117)
ALT SERPL-CCNC: 33 U/L (ref 12–78)
ANION GAP SERPL CALC-SCNC: 12 MMOL/L (ref 5–15)
APTT PPP: 27.5 SEC (ref 22.1–31)
AST SERPL W P-5'-P-CCNC: 26 U/L (ref 15–37)
BASOPHILS # BLD: 0 K/UL (ref 0–0.1)
BASOPHILS NFR BLD: 0 % (ref 0–1)
BILIRUB SERPL-MCNC: 0.3 MG/DL (ref 0.2–1)
BLOOD GROUP ANTIBODIES SERPL: NEGATIVE
BUN SERPL-MCNC: 18 MG/DL (ref 6–20)
BUN/CREAT SERPL: 13 (ref 12–20)
CA-I BLD-MCNC: 9.7 MG/DL (ref 8.5–10.1)
CHLORIDE SERPL-SCNC: 100 MMOL/L (ref 97–108)
CO2 SERPL-SCNC: 27 MMOL/L (ref 21–32)
CREAT SERPL-MCNC: 1.41 MG/DL (ref 0.7–1.3)
CRP SERPL-MCNC: 9.08 MG/DL (ref 0–0.6)
DIFFERENTIAL METHOD BLD: ABNORMAL
EOSINOPHIL # BLD: 0.1 K/UL (ref 0–0.4)
EOSINOPHIL NFR BLD: 1 % (ref 0–7)
ERYTHROCYTE [DISTWIDTH] IN BLOOD BY AUTOMATED COUNT: 14.3 % (ref 11.5–14.5)
ERYTHROCYTE [SEDIMENTATION RATE] IN BLOOD: 107 MM/HR (ref 0–20)
GLOBULIN SER CALC-MCNC: 5.9 G/DL (ref 2–4)
GLUCOSE SERPL-MCNC: 103 MG/DL (ref 65–100)
HCT VFR BLD AUTO: 35 % (ref 36.6–50.3)
HGB BLD-MCNC: 11.3 G/DL (ref 12.1–17)
IMM GRANULOCYTES # BLD AUTO: 0 K/UL (ref 0–0.04)
IMM GRANULOCYTES NFR BLD AUTO: 0 % (ref 0–0.5)
INR PPP: 1.1 (ref 0.9–1.1)
LYMPHOCYTES # BLD: 1.9 K/UL (ref 0.8–3.5)
LYMPHOCYTES NFR BLD: 14 % (ref 12–49)
MCH RBC QN AUTO: 27.6 PG (ref 26–34)
MCHC RBC AUTO-ENTMCNC: 32.3 G/DL (ref 30–36.5)
MCV RBC AUTO: 85.4 FL (ref 80–99)
MONOCYTES # BLD: 1.4 K/UL (ref 0–1)
MONOCYTES NFR BLD: 10 % (ref 5–13)
NEUTS SEG # BLD: 10.3 K/UL (ref 1.8–8)
NEUTS SEG NFR BLD: 75 % (ref 32–75)
PLATELET # BLD AUTO: 502 K/UL (ref 150–400)
PMV BLD AUTO: 7.8 FL (ref 8.9–12.9)
POTASSIUM SERPL-SCNC: 3.1 MMOL/L (ref 3.5–5.1)
PROCALCITONIN SERPL-MCNC: 0.18 NG/ML
PROT SERPL-MCNC: 8.7 G/DL (ref 6.4–8.2)
PROTHROMBIN TIME: 10.8 SEC (ref 9–11.1)
RBC # BLD AUTO: 4.1 M/UL (ref 4.1–5.7)
SODIUM SERPL-SCNC: 139 MMOL/L (ref 136–145)
SPECIMEN EXP DATE BLD: NORMAL
THERAPEUTIC RANGE: NORMAL SEC (ref 82–109)
WBC # BLD AUTO: 13.8 K/UL (ref 4.1–11.1)

## 2023-05-29 PROCEDURE — 86140 C-REACTIVE PROTEIN: CPT

## 2023-05-29 PROCEDURE — 87206 SMEAR FLUORESCENT/ACID STAI: CPT

## 2023-05-29 PROCEDURE — 99223 1ST HOSP IP/OBS HIGH 75: CPT | Performed by: INTERNAL MEDICINE

## 2023-05-29 PROCEDURE — 85610 PROTHROMBIN TIME: CPT

## 2023-05-29 PROCEDURE — 6370000000 HC RX 637 (ALT 250 FOR IP): Performed by: INTERNAL MEDICINE

## 2023-05-29 PROCEDURE — 86038 ANTINUCLEAR ANTIBODIES: CPT

## 2023-05-29 PROCEDURE — 80053 COMPREHEN METABOLIC PANEL: CPT

## 2023-05-29 PROCEDURE — 85730 THROMBOPLASTIN TIME PARTIAL: CPT

## 2023-05-29 PROCEDURE — 2580000003 HC RX 258: Performed by: INTERNAL MEDICINE

## 2023-05-29 PROCEDURE — 86480 TB TEST CELL IMMUN MEASURE: CPT

## 2023-05-29 PROCEDURE — 71275 CT ANGIOGRAPHY CHEST: CPT

## 2023-05-29 PROCEDURE — 87305 ASPERGILLUS AG IA: CPT

## 2023-05-29 PROCEDURE — 86901 BLOOD TYPING SEROLOGIC RH(D): CPT

## 2023-05-29 PROCEDURE — 2500000003 HC RX 250 WO HCPCS: Performed by: INTERNAL MEDICINE

## 2023-05-29 PROCEDURE — 6360000004 HC RX CONTRAST MEDICATION: Performed by: INTERNAL MEDICINE

## 2023-05-29 PROCEDURE — 86900 BLOOD TYPING SEROLOGIC ABO: CPT

## 2023-05-29 PROCEDURE — 71046 X-RAY EXAM CHEST 2 VIEWS: CPT

## 2023-05-29 PROCEDURE — 1100000000 HC RM PRIVATE

## 2023-05-29 PROCEDURE — 6360000002 HC RX W HCPCS: Performed by: INTERNAL MEDICINE

## 2023-05-29 PROCEDURE — 99285 EMERGENCY DEPT VISIT HI MDM: CPT

## 2023-05-29 PROCEDURE — 85025 COMPLETE CBC W/AUTO DIFF WBC: CPT

## 2023-05-29 PROCEDURE — 87389 HIV-1 AG W/HIV-1&-2 AB AG IA: CPT

## 2023-05-29 PROCEDURE — 86850 RBC ANTIBODY SCREEN: CPT

## 2023-05-29 PROCEDURE — 36415 COLL VENOUS BLD VENIPUNCTURE: CPT

## 2023-05-29 PROCEDURE — 85652 RBC SED RATE AUTOMATED: CPT

## 2023-05-29 PROCEDURE — 87449 NOS EACH ORGANISM AG IA: CPT

## 2023-05-29 PROCEDURE — 86738 MYCOPLASMA ANTIBODY: CPT

## 2023-05-29 PROCEDURE — 87116 MYCOBACTERIA CULTURE: CPT

## 2023-05-29 PROCEDURE — 84145 PROCALCITONIN (PCT): CPT

## 2023-05-29 PROCEDURE — 87040 BLOOD CULTURE FOR BACTERIA: CPT

## 2023-05-29 RX ORDER — ACETAMINOPHEN 650 MG/1
650 SUPPOSITORY RECTAL EVERY 6 HOURS PRN
Status: DISCONTINUED | OUTPATIENT
Start: 2023-05-29 | End: 2023-05-31 | Stop reason: HOSPADM

## 2023-05-29 RX ORDER — ACETAMINOPHEN 325 MG/1
650 TABLET ORAL EVERY 6 HOURS PRN
Status: DISCONTINUED | OUTPATIENT
Start: 2023-05-29 | End: 2023-05-31 | Stop reason: HOSPADM

## 2023-05-29 RX ORDER — LEVOFLOXACIN 5 MG/ML
750 INJECTION, SOLUTION INTRAVENOUS
Status: DISCONTINUED | OUTPATIENT
Start: 2023-05-29 | End: 2023-05-31 | Stop reason: HOSPADM

## 2023-05-29 RX ORDER — ONDANSETRON 4 MG/1
4 TABLET, ORALLY DISINTEGRATING ORAL EVERY 8 HOURS PRN
Status: DISCONTINUED | OUTPATIENT
Start: 2023-05-29 | End: 2023-05-31 | Stop reason: HOSPADM

## 2023-05-29 RX ORDER — ACETAMINOPHEN 325 MG/1
650 TABLET ORAL EVERY 4 HOURS PRN
Status: DISCONTINUED | OUTPATIENT
Start: 2023-05-29 | End: 2023-05-29 | Stop reason: SDUPTHER

## 2023-05-29 RX ORDER — SODIUM CHLORIDE 0.9 % (FLUSH) 0.9 %
5-40 SYRINGE (ML) INJECTION PRN
Status: DISCONTINUED | OUTPATIENT
Start: 2023-05-29 | End: 2023-05-31 | Stop reason: HOSPADM

## 2023-05-29 RX ORDER — SODIUM CHLORIDE 9 MG/ML
INJECTION, SOLUTION INTRAVENOUS PRN
Status: DISCONTINUED | OUTPATIENT
Start: 2023-05-29 | End: 2023-05-31 | Stop reason: HOSPADM

## 2023-05-29 RX ORDER — ROSUVASTATIN CALCIUM 5 MG/1
20 TABLET, COATED ORAL NIGHTLY
Status: DISCONTINUED | OUTPATIENT
Start: 2023-05-29 | End: 2023-05-31 | Stop reason: HOSPADM

## 2023-05-29 RX ORDER — METRONIDAZOLE 500 MG/100ML
500 INJECTION, SOLUTION INTRAVENOUS EVERY 8 HOURS
Status: DISCONTINUED | OUTPATIENT
Start: 2023-05-29 | End: 2023-05-29

## 2023-05-29 RX ORDER — ONDANSETRON 2 MG/ML
4 INJECTION INTRAMUSCULAR; INTRAVENOUS EVERY 6 HOURS PRN
Status: DISCONTINUED | OUTPATIENT
Start: 2023-05-29 | End: 2023-05-31 | Stop reason: HOSPADM

## 2023-05-29 RX ORDER — POTASSIUM CHLORIDE 20 MEQ/1
40 TABLET, EXTENDED RELEASE ORAL ONCE
Status: COMPLETED | OUTPATIENT
Start: 2023-05-29 | End: 2023-05-29

## 2023-05-29 RX ORDER — SODIUM CHLORIDE 0.9 % (FLUSH) 0.9 %
5-40 SYRINGE (ML) INJECTION EVERY 12 HOURS SCHEDULED
Status: DISCONTINUED | OUTPATIENT
Start: 2023-05-29 | End: 2023-05-31 | Stop reason: HOSPADM

## 2023-05-29 RX ORDER — PANTOPRAZOLE SODIUM 40 MG/1
40 TABLET, DELAYED RELEASE ORAL
Status: DISCONTINUED | OUTPATIENT
Start: 2023-05-30 | End: 2023-05-31 | Stop reason: HOSPADM

## 2023-05-29 RX ORDER — NICOTINE 21 MG/24HR
1 PATCH, TRANSDERMAL 24 HOURS TRANSDERMAL DAILY
Status: DISCONTINUED | OUTPATIENT
Start: 2023-05-29 | End: 2023-05-31 | Stop reason: HOSPADM

## 2023-05-29 RX ORDER — POLYETHYLENE GLYCOL 3350 17 G/17G
17 POWDER, FOR SOLUTION ORAL DAILY PRN
Status: DISCONTINUED | OUTPATIENT
Start: 2023-05-29 | End: 2023-05-31 | Stop reason: HOSPADM

## 2023-05-29 RX ORDER — SODIUM CHLORIDE, SODIUM LACTATE, POTASSIUM CHLORIDE, CALCIUM CHLORIDE 600; 310; 30; 20 MG/100ML; MG/100ML; MG/100ML; MG/100ML
INJECTION, SOLUTION INTRAVENOUS CONTINUOUS
Status: DISCONTINUED | OUTPATIENT
Start: 2023-05-29 | End: 2023-05-31 | Stop reason: HOSPADM

## 2023-05-29 RX ORDER — IPRATROPIUM BROMIDE AND ALBUTEROL SULFATE 2.5; .5 MG/3ML; MG/3ML
1 SOLUTION RESPIRATORY (INHALATION) EVERY 6 HOURS PRN
Status: DISCONTINUED | OUTPATIENT
Start: 2023-05-29 | End: 2023-05-31 | Stop reason: HOSPADM

## 2023-05-29 RX ADMIN — PIPERACILLIN AND TAZOBACTAM 4500 MG: 4; .5 INJECTION, POWDER, LYOPHILIZED, FOR SOLUTION INTRAVENOUS at 19:42

## 2023-05-29 RX ADMIN — IOPAMIDOL 100 ML: 755 INJECTION, SOLUTION INTRAVENOUS at 11:51

## 2023-05-29 RX ADMIN — ROSUVASTATIN CALCIUM 20 MG: 5 TABLET, COATED ORAL at 22:04

## 2023-05-29 RX ADMIN — LEVOFLOXACIN 750 MG: 5 INJECTION, SOLUTION INTRAVENOUS at 16:19

## 2023-05-29 RX ADMIN — SODIUM CHLORIDE, POTASSIUM CHLORIDE, SODIUM LACTATE AND CALCIUM CHLORIDE: 600; 310; 30; 20 INJECTION, SOLUTION INTRAVENOUS at 22:04

## 2023-05-29 RX ADMIN — ACETAMINOPHEN 650 MG: 325 TABLET ORAL at 14:28

## 2023-05-29 RX ADMIN — METRONIDAZOLE 500 MG: 500 INJECTION, SOLUTION INTRAVENOUS at 16:19

## 2023-05-29 RX ADMIN — SODIUM CHLORIDE, PRESERVATIVE FREE 10 ML: 5 INJECTION INTRAVENOUS at 22:05

## 2023-05-29 RX ADMIN — POTASSIUM CHLORIDE 40 MEQ: 1500 TABLET, EXTENDED RELEASE ORAL at 22:04

## 2023-05-29 ASSESSMENT — PAIN - FUNCTIONAL ASSESSMENT
PAIN_FUNCTIONAL_ASSESSMENT: NONE - DENIES PAIN
PAIN_FUNCTIONAL_ASSESSMENT: NONE - DENIES PAIN

## 2023-05-29 ASSESSMENT — LIFESTYLE VARIABLES
HOW OFTEN DO YOU HAVE A DRINK CONTAINING ALCOHOL: NEVER
HOW MANY STANDARD DRINKS CONTAINING ALCOHOL DO YOU HAVE ON A TYPICAL DAY: PATIENT DOES NOT DRINK

## 2023-05-29 ASSESSMENT — PAIN SCALES - GENERAL
PAINLEVEL_OUTOF10: 0
PAINLEVEL_OUTOF10: 0

## 2023-05-29 NOTE — ED NOTES
Report to floor nurse Erica Jama, report taken after 2nd attempt at calling report. [facility] is in transit to [facility] with patient.       Dayana Wen RN  05/29/23 3335

## 2023-05-29 NOTE — CONSULTS
Infectious Disease Consult Note    Reason for Consult:  Cavitary PNA   Date of Consultation: May 29, 2023  Date of Admission: 5/29/2023  Referring Physician: Dr Carlota Aragon    HPI: Very pleasant 58 y.o BM who presented with a 2 day h/o hemoptysis, found to have b/l cavitary apical lesions on CTA chest for which ID has been consulted. His medical history is negative for CAD, CVA, and chronic tobacco use. 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 He reports cough productive of yellowish  sputum for 2 months, as well as unintentional 30 to 40 pound weight loss. He denies night sweats, fever/chills or sick contacts. He noticed hemoptysis over the past couple days which prompted his ED visit. Denies sig dyspnea. CTA chest was neg for PE, revealed \"Multifocal apical predominant nodules/opacities, as well as large cavitary lesions, may represent chronic mycobacterial or fungal infection\". He spiked a temp of 100.6 earlier today is otherwise medically stable and maintaining O2 sats on RA. Today's labs show a WBC of 13.8, and CR 1.41. He is on levaquin and Metronidazole. Review of Systems:     Gen: Intentional weight loss, intermittently    CV:  Negative for chest pain, dyspnea on exertion, leg edema   Lungs: shortness of breath, chronic cough, hemoptysis   Abdomen: Negative for abdominal pain, nausea, vomiting, diarrhea, constipation   Genitourinary: Negative for genital pain or genital discharge     Neuro: Negative for headache, numbness, tingling, extremity weakness,  syncope, seizures    Skin: Negative for rash, sores/open wounds   Musculoskeletal: Negative for joint pain, joint swelling, joint erythema    Psych: Negative for manic behavior       Past Medical History:  Past Medical History:   Diagnosis Date    CAD (coronary artery disease)     Stroke [facility])          Past surgical history     History reviewed.  No pertinent surgical

## 2023-05-29 NOTE — ED PROVIDER NOTES
EMERGENCY DEPARTMENT HISTORY AND PHYSICAL EXAM      Date: 5/29/2023  Patient Name: Moiz Guaman    History of Presenting Illness         History Provided By:     HPI: Moiz Guaman, is a very pleasant 58 y.o. male presenting to the ED with a chief complaint of cough, throwing up blood. Patient states he has been throwing up for a long time but most recently noticed he was coughing up red blood for the last 2 weeks. His wife quantifies the volume of blood as approximately \" 1 coffee cup's worth over the course of a day. \"  He states he continues to smoke 1 pack of cigarettes daily for the last 50 years. He is also lost 40 pounds over the last 2 years. No chest pain or shortness of breath. No fevers chills nor rigors. Denies any other symptoms at this time. PCP: Javon Leigh    No current facility-administered medications on file prior to encounter. Current Outpatient Medications on File Prior to Encounter   Medication Sig Dispense Refill    aspirin 81 MG chewable tablet Take 1 tablet by mouth daily      chlorthalidone (HYGROTON) 25 MG tablet ceived the following from [facility]: Outside name: chlorthalidone (HYGROTON) 25 mg tablet      lidocaine 4 % external patch Apply to area of maximal pain daily not to exceed 12 hours/day      ondansetron (ZOFRAN-ODT) 4 MG disintegrating tablet Place 1 tablet under the tongue every 8 hours as needed      pantoprazole (PROTONIX) 40 MG tablet Take 1 tablet by mouth every morning (before breakfast)      rosuvastatin (CRESTOR) 20 MG tablet ceived the following from [facility]: Outside name: rosuvastatin (CRESTOR) 20 mg tablet         Past History     Past Medical History:  Past Medical History:   Diagnosis Date    CAD (coronary artery disease)     Stroke [facility])        Past Surgical History:  History reviewed. No pertinent surgical history. Family History:  History reviewed. No pertinent family history.     Social

## 2023-05-29 NOTE — H&P
negative    Objective:     Vitals:    05/29/23 1015 05/29/23 1144 05/29/23 1315   BP: 108/78 122/72 117/81   Pulse: 98 72 88   Resp: 18 16 18   Temp: 98.3 °F (36.8 °C) 99 °F (37.2 °C) (!) 100.6 °F (38.1 °C)   TempSrc: Oral Oral Oral   SpO2: 100% 95% 99%   Weight: 59 kg (130 lb)     Height: 1.778 m (5' 10\")          Pertinent Physical Exam:    General: Awake and responsive; thin/frail bodyframe  Head: No overt injury/trauma  Eye: No overt acute orbital findings, PERRLA   ENT: No acute hearing loss noted; No nasal lesion; Throat clear  Neck: Supple, No overt palpable mass, No significant palpable lymph nodes  Vascular: Regular, symmetric palpable pulses  Lung: CTA bilat, vesicular breath sounds  Heart: S1S2  Abdomen: Soft, NT, No rigidity, No rebound, Bowel sounds present  Extremities: No edema  M/S: No traumatic change, active mobility noted  Skin: No cyanosis, No acute significant rash  Neurologic: No overt focal motor deficit. AxOx3. Psychiatric: Coherent and age appropriate affect    Recent Results (from the past 67 hour(s))   CMP    Collection Time: 05/29/23 10:42 AM   Result Value Ref Range    Sodium 139 136 - 145 mmol/L    Potassium 3.1 (L) 3.5 - 5.1 mmol/L    Chloride 100 97 - 108 mmol/L    CO2 27 21 - 32 mmol/L    Anion Gap 12 5 - 15 mmol/L    Glucose 103 (H) 65 - 100 mg/dL    BUN 18 6 - 20 mg/dL    Creatinine 1.41 (H) 0.70 - 1.30 mg/dL    Bun/Cre Ratio 13 12 - 20      Est, Glom Filt Rate 56 (L) >60 ml/min/1.73m2    Calcium 9.7 8.5 - 10.1 mg/dL    Total Bilirubin 0.3 0.2 - 1.0 mg/dL    AST 26 15 - 37 U/L    ALT 33 12 - 78 U/L    Alk Phosphatase 81 45 - 117 U/L    Total Protein 8.7 (H) 6.4 - 8.2 g/dL    Albumin 2.8 (L) 3.5 - 5.0 g/dL    Globulin 5.9 (H) 2.0 - 4.0 g/dL    Albumin/Globulin Ratio 0.5 (L) 1.1 - 2.2     CBC with Auto Differential    Collection Time: 05/29/23 10:42 AM   Result Value Ref Range    WBC 13.8 (H) 4.1 - 11.1 K/uL    RBC 4.10 4. 10 - 5.70 M/uL    Hemoglobin 11.3 (L) 12.1 - 17.0 g/dL

## 2023-05-29 NOTE — PROGRESS NOTES
Patient arrived to unit vital signs taken nurse aware of elevated temp. Attending paged to receive orders.

## 2023-05-29 NOTE — PLAN OF CARE
Problem: Discharge Planning  Goal: Discharge to home or other facility with appropriate resources  Outcome: Progressing  Flowsheets (Taken 5/29/2023 1635)  Discharge to home or other facility with appropriate resources: Identify barriers to discharge with patient and caregiver

## 2023-05-29 NOTE — ED TRIAGE NOTES
Productive cough x 2 weeks. Yesterday began coughing up blood. Pt is afebrile. Reports decreased appetite and weight loss since having covid.

## 2023-05-29 NOTE — CONSULTS
Pulmonology and Critical Care Consult    Subjective:   Consult Note: 5/29/2023 7:58 PM    Chief Complaint:   Chief Complaint   Patient presents with    Cough    Hemoptysis        This patient has been seen and evaluated at the request of Dr. Amy Mandujano. Patient is a 70-year-old -American male with a history of nicotine dependence, CAD status post MI, prior COVID-19 infection, CVA, hyperlipidemia, and GERD who presented to the hospital with a productive cough over the past 2 months and a more recent development of small-volume hemoptysis. Patient admits to smoking 1 pack/day for the past 50 years, has never been officially diagnosed with COPD but clearly needs an outpatient pulmonary follow-up with PFTs. Nevertheless, he denies any wheezing at this time, but has noticed a productive cough over the past 2 months of yellow sputum. Nevertheless, over the past 2 to 3 days, he has noticed blood intermixed with his sputum as well, which prompted him to come into the hospital.  CTA chest today demonstrated multifocal apical predominant nodules/opacities as well as cavitary lesions in the upper lung zones bilaterally, without significant mediastinal/hilar adenopathy. This has significantly progressed since his last CT scan of the chest in 2021 which showed some perihilar nodular opacities at that time. He denies any night sweats or fever/chills, but has had a 40 pound weight loss which is unexplained over the past year. His white blood cell count is down to 13.8 from 14.2, potassium level 3.1, creatinine up to 1.4 from 1.24 on admission, LFTs normal.  We will give him 40 mill equivalents oral KCl. 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 States that he had yearly chest x-rays for a while, and then just stopped doing them because he was asymptomatic.   I will send off a large infectious work-up

## 2023-05-29 NOTE — ED NOTES
Report given to [facility] provider for pt transport to [facility] rm [address]. Floor still has not called back for nurse to nurse report. Will attempt to call again.           Saige Zuniga RN  05/29/23 8561

## 2023-05-30 LAB
ALBUMIN SERPL-MCNC: 2.4 G/DL (ref 3.5–5)
ALBUMIN/GLOB SERPL: 0.4 (ref 1.1–2.2)
ALP SERPL-CCNC: 66 U/L (ref 45–117)
ALT SERPL-CCNC: 26 U/L (ref 12–78)
ANION GAP SERPL CALC-SCNC: 6 MMOL/L (ref 5–15)
AST SERPL W P-5'-P-CCNC: 20 U/L (ref 15–37)
BASOPHILS # BLD: 0.1 K/UL (ref 0–0.1)
BASOPHILS NFR BLD: 1 % (ref 0–1)
BILIRUB SERPL-MCNC: 0.3 MG/DL (ref 0.2–1)
BUN SERPL-MCNC: 16 MG/DL (ref 6–20)
BUN/CREAT SERPL: 10 (ref 12–20)
CA-I BLD-MCNC: 9.4 MG/DL (ref 8.5–10.1)
CHLORIDE SERPL-SCNC: 103 MMOL/L (ref 97–108)
CO2 SERPL-SCNC: 27 MMOL/L (ref 21–32)
CREAT SERPL-MCNC: 1.58 MG/DL (ref 0.7–1.3)
DIFFERENTIAL METHOD BLD: ABNORMAL
EOSINOPHIL # BLD: 0.1 K/UL (ref 0–0.4)
EOSINOPHIL NFR BLD: 0 % (ref 0–7)
ERYTHROCYTE [DISTWIDTH] IN BLOOD BY AUTOMATED COUNT: 14.5 % (ref 11.5–14.5)
GLOBULIN SER CALC-MCNC: 5.6 G/DL (ref 2–4)
GLUCOSE SERPL-MCNC: 95 MG/DL (ref 65–100)
HCT VFR BLD AUTO: 29.9 % (ref 36.6–50.3)
HGB BLD-MCNC: 9.7 G/DL (ref 12.1–17)
HIV 1+2 AB+HIV1 P24 AG SERPL QL IA: NONREACTIVE
HIV 1+2 AB+HIV1 P24 AG SERPL QL IA: NONREACTIVE
HIV 1/2 RESULT COMMENT: NORMAL
HIV 1/2 RESULT COMMENT: NORMAL
IMM GRANULOCYTES # BLD AUTO: 0.1 K/UL (ref 0–0.04)
IMM GRANULOCYTES NFR BLD AUTO: 0 % (ref 0–0.5)
LYMPHOCYTES # BLD: 2 K/UL (ref 0.8–3.5)
LYMPHOCYTES NFR BLD: 16 % (ref 12–49)
M PNEUMO IGM SER IA-ACNC: NONREACTIVE
MAGNESIUM SERPL-MCNC: 1.4 MG/DL (ref 1.6–2.4)
MCH RBC QN AUTO: 27.1 PG (ref 26–34)
MCHC RBC AUTO-ENTMCNC: 32.4 G/DL (ref 30–36.5)
MCV RBC AUTO: 83.5 FL (ref 80–99)
MONOCYTES # BLD: 1.7 K/UL (ref 0–1)
MONOCYTES NFR BLD: 14 % (ref 5–13)
NEUTS SEG # BLD: 8.4 K/UL (ref 1.8–8)
NEUTS SEG NFR BLD: 69 % (ref 32–75)
NRBC # BLD: 0 K/UL (ref 0–0.01)
NRBC BLD-RTO: 0 PER 100 WBC
PHOSPHATE SERPL-MCNC: 2.8 MG/DL (ref 2.6–4.7)
PLATELET # BLD AUTO: 444 K/UL (ref 150–400)
PMV BLD AUTO: 8.2 FL (ref 8.9–12.9)
POTASSIUM SERPL-SCNC: 3.7 MMOL/L (ref 3.5–5.1)
PROCALCITONIN SERPL-MCNC: 0.19 NG/ML
PROT SERPL-MCNC: 8 G/DL (ref 6.4–8.2)
RBC # BLD AUTO: 3.58 M/UL (ref 4.1–5.7)
SODIUM SERPL-SCNC: 136 MMOL/L (ref 136–145)
WBC # BLD AUTO: 12.2 K/UL (ref 4.1–11.1)

## 2023-05-30 PROCEDURE — 2580000003 HC RX 258: Performed by: INTERNAL MEDICINE

## 2023-05-30 PROCEDURE — 84145 PROCALCITONIN (PCT): CPT

## 2023-05-30 PROCEDURE — 84100 ASSAY OF PHOSPHORUS: CPT

## 2023-05-30 PROCEDURE — 6370000000 HC RX 637 (ALT 250 FOR IP): Performed by: INTERNAL MEDICINE

## 2023-05-30 PROCEDURE — 87116 MYCOBACTERIA CULTURE: CPT

## 2023-05-30 PROCEDURE — 83735 ASSAY OF MAGNESIUM: CPT

## 2023-05-30 PROCEDURE — 87205 SMEAR GRAM STAIN: CPT

## 2023-05-30 PROCEDURE — 87070 CULTURE OTHR SPECIMN AEROBIC: CPT

## 2023-05-30 PROCEDURE — 6360000002 HC RX W HCPCS: Performed by: INTERNAL MEDICINE

## 2023-05-30 PROCEDURE — 87449 NOS EACH ORGANISM AG IA: CPT

## 2023-05-30 PROCEDURE — 80053 COMPREHEN METABOLIC PANEL: CPT

## 2023-05-30 PROCEDURE — 87389 HIV-1 AG W/HIV-1&-2 AB AG IA: CPT

## 2023-05-30 PROCEDURE — 1100000000 HC RM PRIVATE

## 2023-05-30 PROCEDURE — 99232 SBSQ HOSP IP/OBS MODERATE 35: CPT | Performed by: INTERNAL MEDICINE

## 2023-05-30 PROCEDURE — 85025 COMPLETE CBC W/AUTO DIFF WBC: CPT

## 2023-05-30 PROCEDURE — 87206 SMEAR FLUORESCENT/ACID STAI: CPT

## 2023-05-30 RX ORDER — MAGNESIUM SULFATE IN WATER 40 MG/ML
2000 INJECTION, SOLUTION INTRAVENOUS ONCE
Status: COMPLETED | OUTPATIENT
Start: 2023-05-30 | End: 2023-05-30

## 2023-05-30 RX ADMIN — PIPERACILLIN AND TAZOBACTAM 3375 MG: 3; .375 INJECTION, POWDER, LYOPHILIZED, FOR SOLUTION INTRAVENOUS at 12:20

## 2023-05-30 RX ADMIN — PANTOPRAZOLE SODIUM 40 MG: 40 TABLET, DELAYED RELEASE ORAL at 07:59

## 2023-05-30 RX ADMIN — ACETAMINOPHEN 650 MG: 325 TABLET ORAL at 07:59

## 2023-05-30 RX ADMIN — MAGNESIUM SULFATE HEPTAHYDRATE 2000 MG: 40 INJECTION, SOLUTION INTRAVENOUS at 14:45

## 2023-05-30 RX ADMIN — SODIUM CHLORIDE, PRESERVATIVE FREE 10 ML: 5 INJECTION INTRAVENOUS at 12:20

## 2023-05-30 RX ADMIN — PIPERACILLIN AND TAZOBACTAM 3375 MG: 3; .375 INJECTION, POWDER, LYOPHILIZED, FOR SOLUTION INTRAVENOUS at 20:09

## 2023-05-30 RX ADMIN — PIPERACILLIN AND TAZOBACTAM 3375 MG: 3; .375 INJECTION, POWDER, LYOPHILIZED, FOR SOLUTION INTRAVENOUS at 02:16

## 2023-05-30 RX ADMIN — ROSUVASTATIN CALCIUM 20 MG: 5 TABLET, COATED ORAL at 20:10

## 2023-05-30 RX ADMIN — SODIUM CHLORIDE, PRESERVATIVE FREE 10 ML: 5 INJECTION INTRAVENOUS at 20:10

## 2023-05-30 ASSESSMENT — PAIN SCALES - GENERAL: PAINLEVEL_OUTOF10: 0

## 2023-05-30 NOTE — CARE COORDINATION
05/30/23 1454   Service Assessment   Patient Orientation Alert and Oriented   Cognition Alert   History Provided By Patient   Primary [address]  Spouse/Significant Other;Children   Patient's Healthcare Decision Maker is: Legal Next of Kin   PCP Verified by CM Yes   Last Visit to PCP Within last 6 months   Prior Functional Level Independent in ADLs/IADLs   Current Functional Level Independent in ADLs/IADLs   Can patient return to prior living arrangement Yes   Ability to make needs known: Good   Family able to assist with home care needs: Yes   Would you like for me to discuss the discharge plan with any other family members/significant others, and if so, who? Yes  (wife, Fredy Gonzalez.)   Financial Resources Medicare   Social/Functional History   Lives With [employer]; Son   Type of [address] One level   [address] to enter with rails   Entrance Stairs - Number of Steps [address] Yes     CM spoke with patient to complete DCP assessment. Patient curently lives with his wife and 3 children (ages 25, 16, and 8) in a one story house with three steps to the entrance. Patient has a cane but rarely uses, reports he is independent in his ADL's. Patient drives to all his own appointments. Patient reports no CM needs at this time. CM continues to follow and monitor for needs.

## 2023-05-30 NOTE — PROGRESS NOTES
Pulmonology and Critical Care Progress Note    Subjective:     Chief Complaint:   Chief Complaint   Patient presents with    Cough    Hemoptysis        Patient seen and examined in his room on the floor this afternoon, no acute events overnight. Vital signs are stable on room air, negative testing for mycoplasma/HIV, procalcitonin only 0.18, CRP 9.08. White blood cell count down to 12.2 from 13.8, creatinine up to 1.58 from 1.41, magnesium level 1.4. We will give IV magnesium sulfate 2 g x 1 now, continue LR at 75 cc/h and repeat renal function in the morning. He has produced a sputum sample, we will follow-up with these results. Appreciate assistance from infectious disease, currently on IV Levaquin/Zosyn. Patient will need to remain admitted until tuberculosis is ruled out.                   Current Facility-Administered Medications   Medication Dose Route Frequency Provider Last Rate Last Admin    magnesium sulfate 2000 mg in 50 mL IVPB premix  2,000 mg IntraVENous Once Fredrick Bruce, DO        sodium chloride flush 0.9 % injection 5-40 mL  5-40 mL IntraVENous 2 times per day Terry Cardenas MD   10 mL at 05/30/23 1220    sodium chloride flush 0.9 % injection 5-40 mL  5-40 mL IntraVENous PRN Terry Cardenas MD        0.9 % sodium chloride infusion   IntraVENous PRN Terry Cardenas MD        ondansetron (ZOFRAN-ODT) disintegrating tablet 4 mg  4 mg Oral Q8H PRN Terry Cardenas MD        Or    ondansetron [facility]) injection 4 mg  4 mg IntraVENous Q6H PRN Terry Cardenas MD        polyethylene glycol (GLYCOLAX) packet 17 g  17 g Oral Daily PRN Terry Cardenas MD        acetaminophen (TYLENOL) tablet 650 mg  650 mg Oral Q6H PRN Terry Cardenas MD   650 mg at 05/30/23 8154    Or    acetaminophen (TYLENOL) suppository 650 mg  650 mg Rectal Q6H PRN Terry Cardenas MD        nicotine (NICODERM CQ) 14 MG/24HR 1 patch  1 patch TransDERmal Daily Terry Cardenas MD        levoFLOXacin (LEVAQUIN) 750 MG/150ML infusion 750 mg

## 2023-05-30 NOTE — PROGRESS NOTES
Infectious Disease Progress Note           Subjective:   Pt seen and examined at bedside. Stable, denies new complaints, decreased episodes of hemoptysis, remains on RA, no chest pain or fever/chills since admission. Routine sputum Cx is neg, sputum for AFB smear is pending. He remains on airborne precaution   Objective:   Physical Exam:     /67   Pulse 76   Temp 98.8 °F (37.1 °C) (Oral)   Resp 18   Ht 5' 10\" (1.778 m)   Wt 130 lb (59 kg)   SpO2 97%   BMI 18.65 kg/m²    O2 Device: None (Room air)    Temp (24hrs), Av.5 °F (37.5 °C), Min:98.8 °F (37.1 °C), Max:100.5 °F (38.1 °C)    No intake/output data recorded. No intake/output data recorded.     General: NAD, AAO x 4  HEENT: SANTIAGO, Moist mucosa   Lungs: CTA b/l, decreased at the bases, no wheeze/rhonchi   Heart: S1S2+, RRR, no murmur  Abdo: Soft, NT, ND, +BS   : no schwarz cath   Exts: No edema, + pulses b/l   Skin: No wounds, No rashes or lesions    Data Review:       Recent Days:    Recent Labs     23  1042 23  0845   WBC 13.8* 12.2*   HGB 11.3* 9.7*   HCT 35.0* 29.9*   * 444*     Recent Labs     23  1042 23  0845   BUN 18 16   CREATININE 1.41* 1.58*       Lab Results   Component Value Date/Time    CRP 9.08 2023 06:18 PM          Microbiology     Results       Procedure Component Value Units Date/Time    Legionella antigen, urine [[id number]]     Order Status: Sent Specimen: Urine     AFB Culture + Smear W/Rflx ID From Culture [[id number]]     Order Status: Sent Specimen: Sputum Expectorated     Culture, Respiratory [[id number]] Collected: 23 0220    Order Status: Completed Specimen: Sputum Updated: 23 1324     Special Requests No Special Requests        Gram stain Occasional WBCs seen               Occasional apparent Gram Positive Cocci PAIRS            No Epithelial cells seen        Culture PENDING    AFB Culture + Smear W/Rflx ID From Culture [[id number]] Collected:

## 2023-05-30 NOTE — FLOWSHEET NOTE
Comprehensive Nutrition Assessment    Type and Reason for Visit:  (P) Positive Nutrition Screen (MST4)    Nutrition Recommendations/Plan:   Rec regular diet to maximize PO intakes  Encourage PO intakes, honoring preferences as able  Ensure enlive 2x/day  Obtain updated measured weight as able  Monitor and record PO intakes, supplement acceptance, and Bms in I/Os     Malnutrition Assessment:  Malnutrition Status: Moderate malnutrition (05/30/23 1210)    Context:  Acute Illness     Findings of the 6 clinical characteristics of malnutrition:  Energy Intake:  75% or less of estimated energy requirements for 7 or more days  Weight Loss:  Greater than 5% over 1 month     Body Fat Loss:  Unable to assess (airborne iso)     Muscle Mass Loss:  Unable to assess    Fluid Accumulation:  No significant fluid accumulation     Strength:  Not Performed    Nutrition Assessment:    (P) Admitted for hemoptysis. Pt with productive cough x2 wks PTA. CTA showed cavitary lung lesion, on airborne precautions for TB rule-out. Pt reports significant weight loss, decreased appetite/intake s/p COVID. Difficult to verify time frame/amount with EMAR wt hx given stated weights. Plan to add ONS to help meet needs in setting of decreased appetite/intake, wt loss. Labs: Na 136, K 3.7, BUN 16, Creat 1.58, Gluc 95, Alb 2.4. Meds: niceotine, pantoprazole, KCl, rosuvastatin    Nutrition Related Findings:    (P) NFPE deferred r/t airborne iso. No n/v, d/c, or problems chewing/swallowing. No edema. BM PTA. Wound Type: (P) None       Current Nutrition Intake & Therapies:    Average Meal Intake: (P) 26-50%  Average Supplements Intake: (P) None Ordered  ADULT DIET; Regular; Low Fat/Low Chol/High Fiber/2 gm Na    Anthropometric Measures:  Height: (P) 177.8 cm (5' 10\")  Ideal Body Weight (IBW): (P) 166 lbs ((P) 75 kg)    Current Body Weight: (P) 130 lb 1.1 oz (59 kg), (P) 78.4 % IBW.  Weight Source: (P) Stated  Current BMI (kg/m2): (P) 18.7  BMI

## 2023-05-30 NOTE — PROGRESS NOTES
Hospitalist Progress Note    NAME:   Sujata Wade   : 1960   MRN: [id number]     Date/Time: 2023 5:51 PM  Patient PCP: Araceli Portillo / Plan:  Cavitary lung disease - suspected  pneumonia but with hx Tb positive, could be latent TB. Less likely malignancy (hx tobacco use). On empiric abx with zosyn and levaquin  and ID following. Serologies unrevealing so far. Awaiting blood and sputum cx as well as AFB smear. Continue isolation pending TB r/o  Oxygen as needed - currently on RA  Hemoptysis improved - consider restarting aspirin on dc pending clinical course    MICHELLE vs CKD stage 3a - slight elevation in creatinine but no results since . Poor po intake recently and on IVF  If not improved in am, will dc fluids/check renal US    CAD - asymptomatic. Continue statin. Aspirin on hold due to hemoptysis. Tobacco us e- no hx COPD and no bronchospasm on exam. Advised cessation. NRT    Code Status: full code  DVT Prophylaxis: scd      Subjective:     Chief Complaint / Reason for Physician Visit  Feels good. No SOB and minimal cough. Hemoptysis better. Low grade fevers. Poor appetite but has improoved a little. No CP      Objective:     VITALS:   Last 24hrs VS reviewed since prior progress note. Most recent are:  Patient Vitals for the past 24 hrs:   BP Temp Temp src Pulse Resp SpO2 Height   23 1211 -- -- -- -- -- -- 1.778 m (5' 10\")   23 1000 100/67 98.8 °F (37.1 °C) Oral 76 18 -- --   23 0745 97/65 (!) 100.5 °F (38.1 °C) Oral 74 -- -- --   23 100/68 99.3 °F (37.4 °C) Oral 74 18 97 % --       No intake or output data in the 24 hours ending 23 1751     I had a face to face encounter and independently examined this patient on 2023, as outlined below:  PHYSICAL EXAM:  General: Alert, cooperative  EENT:  EOMI. Anicteric sclerae. Resp:  CTA bilaterally, no wheezing or rales.   No accessory muscle use  CV:  Regular  rhythm,  No

## 2023-05-31 VITALS
SYSTOLIC BLOOD PRESSURE: 105 MMHG | WEIGHT: 130 LBS | HEART RATE: 73 BPM | HEIGHT: 70 IN | DIASTOLIC BLOOD PRESSURE: 63 MMHG | TEMPERATURE: 98.3 F | BODY MASS INDEX: 18.61 KG/M2 | OXYGEN SATURATION: 97 % | RESPIRATION RATE: 18 BRPM

## 2023-05-31 LAB — ANA SER QL: NEGATIVE

## 2023-05-31 PROCEDURE — 6370000000 HC RX 637 (ALT 250 FOR IP): Performed by: INTERNAL MEDICINE

## 2023-05-31 PROCEDURE — 2580000003 HC RX 258: Performed by: INTERNAL MEDICINE

## 2023-05-31 PROCEDURE — 87116 MYCOBACTERIA CULTURE: CPT

## 2023-05-31 PROCEDURE — 87206 SMEAR FLUORESCENT/ACID STAI: CPT

## 2023-05-31 PROCEDURE — 6360000002 HC RX W HCPCS: Performed by: INTERNAL MEDICINE

## 2023-05-31 RX ADMIN — PANTOPRAZOLE SODIUM 40 MG: 40 TABLET, DELAYED RELEASE ORAL at 06:03

## 2023-05-31 RX ADMIN — PIPERACILLIN AND TAZOBACTAM 3375 MG: 3; .375 INJECTION, POWDER, LYOPHILIZED, FOR SOLUTION INTRAVENOUS at 03:55

## 2023-05-31 RX ADMIN — SODIUM CHLORIDE, POTASSIUM CHLORIDE, SODIUM LACTATE AND CALCIUM CHLORIDE: 600; 310; 30; 20 INJECTION, SOLUTION INTRAVENOUS at 01:07

## 2023-05-31 NOTE — PROGRESS NOTES
Writer notified by nursing supervisor patient left AMA and is a TB rule out patient. Naveen Ayoub, MSN, RN [address] TB  notified of patient leaving [facility]. Ly will reach out to patient/family for follow up. [facility]

## 2023-05-31 NOTE — PLAN OF CARE
Problem: Discharge Planning  Goal: Discharge to home or other facility with appropriate resources  5/31/2023 0144 by Errol Bingham RN  Outcome: Progressing  Flowsheets (Taken 5/30/2023 1954)  Discharge to home or other facility with appropriate resources: Identify barriers to discharge with patient and caregiver  5/30/2023 1622 by Aliza Alba LPN  Outcome: Progressing     Problem: Chronic Conditions and Co-morbidities  Goal: Patient's chronic conditions and co-morbidity symptoms are monitored and maintained or improved  5/31/2023 0144 by Errol Bingham RN  Outcome: Progressing  5/30/2023 1622 by Aliza Alba LPN  Outcome: Progressing

## 2023-05-31 NOTE — PROGRESS NOTES
Physician Progress Note      PATIENT:               Cinthia Mauricio  CSN #:                  [id number]  :                       1960  ADMIT DATE:       2023 10:18 AM  DISCH DATE:  RESPONDING  PROVIDER #:        Jez Medina MD          QUERY TEXT:    Patient admitted with Pneumonia. Noted to have moderate malnutrition by RD   consultant. If possible, please document in progress notes and discharge   summary if you are evaluating and /or treating any of the following: The medical record reflects the following:  Risk Factors: PNA, COPD, MICHELLE, CAD, GERD  Clinical Indicators: RD PN: \"Malnutrition Status: Moderate malnutrition. Weight Loss:  Greater than 5% over 1 month. 75% or less of estimated energy   requirements for 7 or more days. \" BMI 18.65   ALB 2.4  Treatment: RD Consulted, Ensure enlive 2x/day, Rec regular diet to maximize PO   intakes    ASPEN Criteria:    https://aspenjournals. onlinelibrary. caro. com/doi/full/10.1177/836365666044379  5    Thank you,  COLT London, RN, [employer] , CDI Specialist  [email or web address]  Can also be reached on [employer]  Options provided:  -- Protein calorie malnutrition moderate  -- Other - I will add my own diagnosis  -- Disagree - Not applicable / Not valid  -- Disagree - Clinically unable to determine / Unknown  -- Refer to Clinical Documentation Reviewer    PROVIDER RESPONSE TEXT:    This patient has moderate protein calorie malnutrition.     Query created by: Melania Ojeda on 2023 7:11 AM      Electronically signed by:  eJz Medina MD 2023 7:15 AM

## 2023-05-31 NOTE — PROGRESS NOTES
Pt left AMA at this time. Pt requested to leave AMA at about [phone] but agreed to wait for me to page the doctor. Dr. Khoa Hines paged and notified that pt wants to leave AMA and I requested Dr. Khoa Hines to come see patient asap. Pt wife notified me that pt wanted his IV removed before he did it himself and that he did not want to wait for the physician. 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 IV removed. AMA paperwork signed. Physician notified.

## 2023-06-01 LAB
BACTERIA SPEC CULT: NORMAL
GALACTOMANNAN AG SPEC IA-ACNC: 0.05 INDEX (ref 0–0.49)
GRAM STN SPEC: NORMAL
Lab: NORMAL
M TB IFN-G BLD-IMP: POSITIVE
M TB IFN-G CD4+ T-CELLS BLD-ACNC: 2.3 IU/ML
M TBIFN-G CD4+ CD8+T-CELLS BLD-ACNC: 2.33 IU/ML
QUANTIFERON CRITERIA: ABNORMAL
QUANTIFERON MITOGEN VALUE: >10 IU/ML
QUANTIFERON NIL VALUE: 0.15 IU/ML

## 2023-06-02 LAB — 1,3 BETA GLUCAN SER-MCNC: <31 PG/ML

## 2023-06-04 LAB
BACTERIA SPEC CULT: NORMAL
Lab: NORMAL

## 2023-06-06 LAB
BACTERIA SPEC CULT: NORMAL
Lab: NORMAL

## 2023-06-10 LAB
ACID FAST STN SPEC: NEGATIVE
ACID FAST STN SPEC: NEGATIVE
MYCOBACTERIUM SPEC QL CULT: NORMAL
MYCOBACTERIUM SPEC QL CULT: NORMAL
SPECIMEN PREPARATION: NORMAL
SPECIMEN PREPARATION: NORMAL
SPECIMEN SOURCE: NORMAL
SPECIMEN SOURCE: NORMAL

## 2023-06-20 ENCOUNTER — CLINICAL DOCUMENTATION (OUTPATIENT)
Facility: HOSPITAL | Age: 63
End: 2023-06-20

## 2023-06-20 LAB
ACID FAST STN SPEC: NEGATIVE
MYCOBACTERIUM SPEC QL CULT: POSITIVE
SPECIMEN PREPARATION: ABNORMAL
SPECIMEN SOURCE: ABNORMAL

## 2023-06-23 ENCOUNTER — CLINICAL DOCUMENTATION (OUTPATIENT)
Facility: HOSPITAL | Age: 63
End: 2023-06-23

## 2023-07-11 ENCOUNTER — OFFICE VISIT (OUTPATIENT)
Age: 63
End: 2023-07-11
Payer: MEDICARE

## 2023-07-11 VITALS
OXYGEN SATURATION: 98 % | WEIGHT: 130.4 LBS | DIASTOLIC BLOOD PRESSURE: 75 MMHG | HEIGHT: 70 IN | HEART RATE: 75 BPM | SYSTOLIC BLOOD PRESSURE: 112 MMHG | BODY MASS INDEX: 18.67 KG/M2 | TEMPERATURE: 98.6 F | RESPIRATION RATE: 20 BRPM

## 2023-07-11 DIAGNOSIS — A15.0 TB (PULMONARY TUBERCULOSIS): Primary | ICD-10-CM

## 2023-07-11 DIAGNOSIS — R63.4 WEIGHT LOSS: ICD-10-CM

## 2023-07-11 DIAGNOSIS — L29.9 PRURITUS: ICD-10-CM

## 2023-07-11 DIAGNOSIS — J44.9 CHRONIC OBSTRUCTIVE PULMONARY DISEASE, UNSPECIFIED COPD TYPE (HCC): ICD-10-CM

## 2023-07-11 DIAGNOSIS — A15.0 TB (PULMONARY TUBERCULOSIS): ICD-10-CM

## 2023-07-11 PROBLEM — I63.9 CEREBROVASCULAR ACCIDENT (HCC): Status: ACTIVE | Noted: 2021-09-15

## 2023-07-11 PROBLEM — K21.9 GASTROESOPHAGEAL REFLUX DISEASE: Status: ACTIVE | Noted: 2021-09-15

## 2023-07-11 PROBLEM — I10 HYPERTENSION: Status: ACTIVE | Noted: 2021-09-15

## 2023-07-11 PROBLEM — I25.10 ARTERIOSCLEROSIS OF CORONARY ARTERY: Status: ACTIVE | Noted: 2021-09-15

## 2023-07-11 PROBLEM — I21.9 ACUTE MYOCARDIAL INFARCTION (HCC): Status: ACTIVE | Noted: 2021-09-15

## 2023-07-11 PROCEDURE — 3078F DIAST BP <80 MM HG: CPT | Performed by: INTERNAL MEDICINE

## 2023-07-11 PROCEDURE — 3023F SPIROM DOC REV: CPT | Performed by: INTERNAL MEDICINE

## 2023-07-11 PROCEDURE — 3074F SYST BP LT 130 MM HG: CPT | Performed by: INTERNAL MEDICINE

## 2023-07-11 PROCEDURE — G8427 DOCREV CUR MEDS BY ELIG CLIN: HCPCS | Performed by: INTERNAL MEDICINE

## 2023-07-11 PROCEDURE — 4004F PT TOBACCO SCREEN RCVD TLK: CPT | Performed by: INTERNAL MEDICINE

## 2023-07-11 PROCEDURE — 99214 OFFICE O/P EST MOD 30 MIN: CPT | Performed by: INTERNAL MEDICINE

## 2023-07-11 PROCEDURE — 3017F COLORECTAL CA SCREEN DOC REV: CPT | Performed by: INTERNAL MEDICINE

## 2023-07-11 PROCEDURE — G8420 CALC BMI NORM PARAMETERS: HCPCS | Performed by: INTERNAL MEDICINE

## 2023-07-11 RX ORDER — M-VIT,TX,IRON,MINS/CALC/FOLIC 27MG-0.4MG
1 TABLET ORAL DAILY
COMMUNITY

## 2023-07-11 RX ORDER — ISONIAZID 300 MG/1
TABLET ORAL
COMMUNITY
Start: 2023-06-28

## 2023-07-11 RX ORDER — LANOLIN ALCOHOL/MO/W.PET/CERES
CREAM (GRAM) TOPICAL
COMMUNITY
Start: 2023-06-28

## 2023-07-11 RX ORDER — ETHAMBUTOL HYDROCHLORIDE 400 MG/1
TABLET, FILM COATED ORAL
COMMUNITY
Start: 2023-06-28

## 2023-07-11 RX ORDER — HYDROXYZINE HYDROCHLORIDE 25 MG/1
25 TABLET, FILM COATED ORAL EVERY 8 HOURS PRN
Qty: 30 TABLET | Refills: 1 | Status: SHIPPED | OUTPATIENT
Start: 2023-07-11 | End: 2023-08-10

## 2023-07-11 RX ORDER — RIFAMPIN 300 MG/1
CAPSULE ORAL
COMMUNITY
Start: 2023-06-28

## 2023-07-11 RX ORDER — PYRAZINAMIDE TABLET 500 MG/1
TABLET ORAL
COMMUNITY
Start: 2023-06-28

## 2023-07-11 RX ORDER — PANTOPRAZOLE SODIUM 20 MG/1
TABLET, DELAYED RELEASE ORAL
COMMUNITY
Start: 2023-05-09

## 2023-07-11 ASSESSMENT — ENCOUNTER SYMPTOMS
SHORTNESS OF BREATH: 1
ROS SKIN COMMENTS: PRURITUS
COUGH: 1

## 2023-07-11 NOTE — PROGRESS NOTES
HPI Pt presents for a routine follow up of pulmonary MTB. He was recently admitted to [facility] w c/o weight loss and hemoptysis, found to have Multifocal pneumonia with numerous nodular opacities in b/l upper lobes on CT (05/29/23). He reported a h/o latent TB in the 1980s, treated w 9 months of isoniazid and pyridoxine. Pt left [facility] AMA prior to completion of work up and ended up at [facility]. His sputum Cx did come back positive for AFB. 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 He admits to 100% compliance w the meds, tolerating for the most part, except for pruritus. His appetite is improving and cough now productive of clear sputum, no hemoptysis. Per [facility] last sputum Cx on 06/28/23 was positive for AFB, sample from last wk was lost, repeat sputum collection is scheduled for 07/12/23. AFB Cx was was sent to [facility] for susceptibility testing by [facility], revealed susceptibility to Rifampin, resistance to Isoniazid could not be ruled out, but final work up is pending. He is also being following by Pulmonary, Dr Nati Zendejas, has an up coming apt. Pt inquired when he can come off precaution. ROS  Review of Systems   Constitutional: Negative. Respiratory:  Positive for cough and shortness of breath. Cardiovascular: Negative. Genitourinary: Negative. Skin: Negative. Pruritus    Neurological: Negative. Past Medical History:   Diagnosis Date    CAD (coronary artery disease)     Stroke [facility])         History reviewed. No pertinent surgical history. Social History     Tobacco Use    Smoking status: Every Day   Vaping Use    Vaping Use: Never used   Substance Use Topics    Alcohol use: Not Currently    Drug use: Yes     Types: Marijuana Meredith Rival)        History reviewed. No pertinent family history.      Allergies   Allergen Reactions    Ace Inhibitors Swelling     Reaction

## 2023-07-12 LAB
ALBUMIN SERPL-MCNC: 3.9 G/DL (ref 3.9–4.9)
ALBUMIN/GLOB SERPL: 0.9 {RATIO} (ref 1.2–2.2)
ALP SERPL-CCNC: 106 IU/L (ref 44–121)
ALT SERPL-CCNC: 11 IU/L (ref 0–44)
AST SERPL-CCNC: 17 IU/L (ref 0–40)
BILIRUB SERPL-MCNC: <0.2 MG/DL (ref 0–1.2)
BUN SERPL-MCNC: 18 MG/DL (ref 8–27)
BUN/CREAT SERPL: 13 (ref 10–24)
CALCIUM SERPL-MCNC: 9.9 MG/DL (ref 8.6–10.2)
CHLORIDE SERPL-SCNC: 103 MMOL/L (ref 96–106)
CO2 SERPL-SCNC: 24 MMOL/L (ref 20–29)
CREAT SERPL-MCNC: 1.36 MG/DL (ref 0.76–1.27)
EGFRCR SERPLBLD CKD-EPI 2021: 59 ML/MIN/1.73
GLOBULIN SER CALC-MCNC: 4.2 G/DL (ref 1.5–4.5)
GLUCOSE SERPL-MCNC: 89 MG/DL (ref 70–99)
POTASSIUM SERPL-SCNC: 4.6 MMOL/L (ref 3.5–5.2)
PROT SERPL-MCNC: 8.1 G/DL (ref 6–8.5)
SODIUM SERPL-SCNC: 140 MMOL/L (ref 134–144)

## 2023-09-12 ENCOUNTER — TELEPHONE (OUTPATIENT)
Age: 63
End: 2023-09-12

## 2023-09-12 ENCOUNTER — OFFICE VISIT (OUTPATIENT)
Age: 63
End: 2023-09-12
Payer: MEDICARE

## 2023-09-12 VITALS
OXYGEN SATURATION: 98 % | DIASTOLIC BLOOD PRESSURE: 79 MMHG | HEART RATE: 75 BPM | RESPIRATION RATE: 16 BRPM | HEIGHT: 70 IN | BODY MASS INDEX: 19.93 KG/M2 | SYSTOLIC BLOOD PRESSURE: 121 MMHG | WEIGHT: 139.2 LBS | TEMPERATURE: 98.7 F

## 2023-09-12 DIAGNOSIS — A15.0: Primary | ICD-10-CM

## 2023-09-12 DIAGNOSIS — A15.0: ICD-10-CM

## 2023-09-12 DIAGNOSIS — R63.4 WEIGHT LOSS: ICD-10-CM

## 2023-09-12 PROCEDURE — 3074F SYST BP LT 130 MM HG: CPT | Performed by: INTERNAL MEDICINE

## 2023-09-12 PROCEDURE — 4004F PT TOBACCO SCREEN RCVD TLK: CPT | Performed by: INTERNAL MEDICINE

## 2023-09-12 PROCEDURE — 3017F COLORECTAL CA SCREEN DOC REV: CPT | Performed by: INTERNAL MEDICINE

## 2023-09-12 PROCEDURE — 99214 OFFICE O/P EST MOD 30 MIN: CPT | Performed by: INTERNAL MEDICINE

## 2023-09-12 PROCEDURE — 3078F DIAST BP <80 MM HG: CPT | Performed by: INTERNAL MEDICINE

## 2023-09-12 PROCEDURE — G8427 DOCREV CUR MEDS BY ELIG CLIN: HCPCS | Performed by: INTERNAL MEDICINE

## 2023-09-12 PROCEDURE — G8420 CALC BMI NORM PARAMETERS: HCPCS | Performed by: INTERNAL MEDICINE

## 2023-09-12 ASSESSMENT — ENCOUNTER SYMPTOMS
GASTROINTESTINAL NEGATIVE: 1
RESPIRATORY NEGATIVE: 1

## 2023-09-12 NOTE — TELEPHONE ENCOUNTER
Pennie Teague is calling to let you know that she was unable to get lab work. She wants to know if you will send him to [facility] after the appt. Today. She said his culture from July 26 is showing 1+ growth PB and his Sept. 8 Spudum test is 2+ smear.

## 2023-09-13 LAB
ALBUMIN SERPL-MCNC: 3.9 G/DL (ref 3.9–4.9)
ALBUMIN/GLOB SERPL: 0.9 {RATIO} (ref 1.2–2.2)
ALP SERPL-CCNC: 130 IU/L (ref 44–121)
ALT SERPL-CCNC: 9 IU/L (ref 0–44)
AST SERPL-CCNC: 23 IU/L (ref 0–40)
BASOPHILS # BLD AUTO: 0.1 X10E3/UL (ref 0–0.2)
BASOPHILS NFR BLD AUTO: 1 %
BILIRUB SERPL-MCNC: 0.3 MG/DL (ref 0–1.2)
BUN SERPL-MCNC: 17 MG/DL (ref 8–27)
BUN/CREAT SERPL: 13 (ref 10–24)
CALCIUM SERPL-MCNC: 10.6 MG/DL (ref 8.6–10.2)
CHLORIDE SERPL-SCNC: 105 MMOL/L (ref 96–106)
CO2 SERPL-SCNC: 22 MMOL/L (ref 20–29)
CREAT SERPL-MCNC: 1.32 MG/DL (ref 0.76–1.27)
CRP SERPL HS-MCNC: 35.76 MG/L (ref 0–3)
EGFRCR SERPLBLD CKD-EPI 2021: 61 ML/MIN/1.73
EOSINOPHIL # BLD AUTO: 0.2 X10E3/UL (ref 0–0.4)
EOSINOPHIL NFR BLD AUTO: 2 %
ERYTHROCYTE [DISTWIDTH] IN BLOOD BY AUTOMATED COUNT: 14.6 % (ref 11.6–15.4)
ERYTHROCYTE [SEDIMENTATION RATE] IN BLOOD BY WESTERGREN METHOD: 19 MM/HR (ref 0–30)
GLOBULIN SER CALC-MCNC: 4.3 G/DL (ref 1.5–4.5)
GLUCOSE SERPL-MCNC: 88 MG/DL (ref 70–99)
HCT VFR BLD AUTO: 40.4 % (ref 37.5–51)
HGB BLD-MCNC: 13 G/DL (ref 13–17.7)
IMM GRANULOCYTES # BLD AUTO: 0 X10E3/UL (ref 0–0.1)
IMM GRANULOCYTES NFR BLD AUTO: 0 %
LYMPHOCYTES # BLD AUTO: 2.5 X10E3/UL (ref 0.7–3.1)
LYMPHOCYTES NFR BLD AUTO: 22 %
MCH RBC QN AUTO: 28.3 PG (ref 26.6–33)
MCHC RBC AUTO-ENTMCNC: 32.2 G/DL (ref 31.5–35.7)
MCV RBC AUTO: 88 FL (ref 79–97)
MONOCYTES # BLD AUTO: 1.1 X10E3/UL (ref 0.1–0.9)
MONOCYTES NFR BLD AUTO: 9 %
NEUTROPHILS # BLD AUTO: 7.5 X10E3/UL (ref 1.4–7)
NEUTROPHILS NFR BLD AUTO: 66 %
PLATELET # BLD AUTO: 422 X10E3/UL (ref 150–450)
POTASSIUM SERPL-SCNC: 5.2 MMOL/L (ref 3.5–5.2)
PROT SERPL-MCNC: 8.2 G/DL (ref 6–8.5)
RBC # BLD AUTO: 4.6 X10E6/UL (ref 4.14–5.8)
SODIUM SERPL-SCNC: 144 MMOL/L (ref 134–144)
WBC # BLD AUTO: 11.4 X10E3/UL (ref 3.4–10.8)

## 2023-10-13 ENCOUNTER — TELEPHONE (OUTPATIENT)
Age: 63
End: 2023-10-13

## 2023-10-13 NOTE — TELEPHONE ENCOUNTER
Would like your input regarding taking Judy Sequeira off of isolation. His August 24 culture came back showing no growth. His culture from August 31 will be finalized today but it is not showing growth either.

## 2023-10-16 DIAGNOSIS — A15.0 PULMONARY TB: Primary | ICD-10-CM

## 2023-10-30 DIAGNOSIS — A15.0 PULMONARY TB: ICD-10-CM

## 2023-11-07 LAB
CRP SERPL HS-MCNC: 16.67 MG/L (ref 0–3)
ERYTHROCYTE [SEDIMENTATION RATE] IN BLOOD BY WESTERGREN METHOD: 93 MM/HR (ref 0–30)

## 2024-05-14 ENCOUNTER — TELEPHONE (OUTPATIENT)
Age: 64
End: 2024-05-14

## 2024-05-14 NOTE — TELEPHONE ENCOUNTER
[facility] with you with his end of treatment care as it will end next month.

## 2024-07-01 ENCOUNTER — HOSPITAL ENCOUNTER (OUTPATIENT)
Facility: HOSPITAL | Age: 64
Discharge: HOME | End: 2024-07-04
Payer: MEDICARE

## 2024-07-01 ENCOUNTER — TRANSCRIBE ORDERS (OUTPATIENT)
Facility: HOSPITAL | Age: 64
End: 2024-07-01

## 2024-07-01 DIAGNOSIS — A15.9 TB (TUBERCULOSIS): ICD-10-CM

## 2024-07-01 DIAGNOSIS — A15.9 TB (TUBERCULOSIS): Primary | ICD-10-CM

## 2024-07-01 PROCEDURE — 71046 X-RAY EXAM CHEST 2 VIEWS: CPT
